# Patient Record
Sex: FEMALE | Race: WHITE | Employment: OTHER | URBAN - METROPOLITAN AREA
[De-identification: names, ages, dates, MRNs, and addresses within clinical notes are randomized per-mention and may not be internally consistent; named-entity substitution may affect disease eponyms.]

---

## 2017-09-22 ENCOUNTER — APPOINTMENT (OUTPATIENT)
Dept: LAB | Age: 82
End: 2017-09-22
Attending: INTERNAL MEDICINE
Payer: MEDICARE

## 2017-09-22 PROCEDURE — 84166 PROTEIN E-PHORESIS/URINE/CSF: CPT | Performed by: INTERNAL MEDICINE

## 2017-09-22 PROCEDURE — 86335 IMMUNFIX E-PHORSIS/URINE/CSF: CPT | Performed by: INTERNAL MEDICINE

## 2019-01-03 ENCOUNTER — HOSPITAL ENCOUNTER (OUTPATIENT)
Dept: GENERAL RADIOLOGY | Age: 84
Discharge: HOME | End: 2019-01-03
Attending: INTERNAL MEDICINE
Payer: MEDICARE

## 2019-01-03 DIAGNOSIS — R05.9 COUGH: ICD-10-CM

## 2019-01-03 PROCEDURE — 71046 X-RAY EXAM CHEST 2 VIEWS: CPT | Performed by: INTERNAL MEDICINE

## 2019-08-06 PROBLEM — S52.501A CLOSED FRACTURE OF RIGHT DISTAL RADIUS: Status: ACTIVE | Noted: 2019-08-06

## 2020-02-25 PROBLEM — L30.9 DERMATITIS: Status: ACTIVE | Noted: 2020-02-25

## 2020-02-25 PROBLEM — G56.01 CARPAL TUNNEL SYNDROME OF RIGHT WRIST: Status: ACTIVE | Noted: 2020-02-25

## 2020-09-03 ENCOUNTER — TELEPHONE (OUTPATIENT)
Dept: INTERNAL MEDICINE CLINIC | Facility: CLINIC | Age: 85
End: 2020-09-03

## 2020-09-03 ENCOUNTER — APPOINTMENT (OUTPATIENT)
Dept: GENERAL RADIOLOGY | Facility: HOSPITAL | Age: 85
End: 2020-09-03
Attending: EMERGENCY MEDICINE
Payer: MEDICARE

## 2020-09-03 ENCOUNTER — HOSPITAL ENCOUNTER (OUTPATIENT)
Facility: HOSPITAL | Age: 85
Setting detail: OBSERVATION
Discharge: HOME | End: 2020-09-04
Attending: EMERGENCY MEDICINE | Admitting: HOSPITALIST
Payer: MEDICARE

## 2020-09-03 DIAGNOSIS — R55 SYNCOPE AND COLLAPSE: Primary | ICD-10-CM

## 2020-09-03 LAB
ANION GAP SERPL CALC-SCNC: 6 MMOL/L (ref 0–18)
BASOPHILS # BLD AUTO: 0.04 X10(3) UL (ref 0–0.2)
BASOPHILS NFR BLD AUTO: 0.4 %
BUN BLD-MCNC: 17 MG/DL (ref 7–18)
BUN/CREAT SERPL: 17.3 (ref 10–20)
CALCIUM BLD-MCNC: 9.6 MG/DL (ref 8.5–10.1)
CHLORIDE SERPL-SCNC: 100 MMOL/L (ref 98–112)
CO2 SERPL-SCNC: 25 MMOL/L (ref 21–32)
CREAT BLD-MCNC: 0.98 MG/DL (ref 0.55–1.02)
DEPRECATED RDW RBC AUTO: 47.2 FL (ref 35.1–46.3)
EOSINOPHIL # BLD AUTO: 0.08 X10(3) UL (ref 0–0.7)
EOSINOPHIL NFR BLD AUTO: 0.8 %
ERYTHROCYTE [DISTWIDTH] IN BLOOD BY AUTOMATED COUNT: 13.9 % (ref 11–15)
GLUCOSE BLD-MCNC: 91 MG/DL (ref 70–99)
HAV IGM SER QL: 2.2 MG/DL (ref 1.6–2.6)
HCT VFR BLD AUTO: 38.1 % (ref 35–48)
HGB BLD-MCNC: 12.9 G/DL (ref 12–16)
IMM GRANULOCYTES # BLD AUTO: 0.03 X10(3) UL (ref 0–1)
IMM GRANULOCYTES NFR BLD: 0.3 %
LYMPHOCYTES # BLD AUTO: 1.59 X10(3) UL (ref 1–4)
LYMPHOCYTES NFR BLD AUTO: 15.5 %
MCH RBC QN AUTO: 31.4 PG (ref 26–34)
MCHC RBC AUTO-ENTMCNC: 33.9 G/DL (ref 31–37)
MCV RBC AUTO: 92.7 FL (ref 80–100)
MONOCYTES # BLD AUTO: 0.52 X10(3) UL (ref 0.1–1)
MONOCYTES NFR BLD AUTO: 5.1 %
NEUTROPHILS # BLD AUTO: 8.03 X10 (3) UL (ref 1.5–7.7)
NEUTROPHILS # BLD AUTO: 8.03 X10(3) UL (ref 1.5–7.7)
NEUTROPHILS NFR BLD AUTO: 77.9 %
OSMOLALITY SERPL CALC.SUM OF ELEC: 273 MOSM/KG (ref 275–295)
PLATELET # BLD AUTO: 239 10(3)UL (ref 150–450)
POTASSIUM SERPL-SCNC: 4.2 MMOL/L (ref 3.5–5.1)
RBC # BLD AUTO: 4.11 X10(6)UL (ref 3.8–5.3)
SODIUM SERPL-SCNC: 131 MMOL/L (ref 136–145)
TROPONIN I SERPL-MCNC: <0.045 NG/ML (ref ?–0.04)
TROPONIN I SERPL-MCNC: <0.045 NG/ML (ref ?–0.04)
WBC # BLD AUTO: 10.3 X10(3) UL (ref 4–11)

## 2020-09-03 PROCEDURE — 71045 X-RAY EXAM CHEST 1 VIEW: CPT | Performed by: EMERGENCY MEDICINE

## 2020-09-03 PROCEDURE — 99220 INITIAL OBSERVATION CARE,LEVL III: CPT | Performed by: HOSPITALIST

## 2020-09-03 RX ORDER — ACETAMINOPHEN 325 MG/1
650 TABLET ORAL EVERY 6 HOURS PRN
Status: DISCONTINUED | OUTPATIENT
Start: 2020-09-03 | End: 2020-09-04

## 2020-09-03 RX ORDER — ONDANSETRON 2 MG/ML
4 INJECTION INTRAMUSCULAR; INTRAVENOUS EVERY 6 HOURS PRN
Status: DISCONTINUED | OUTPATIENT
Start: 2020-09-03 | End: 2020-09-04

## 2020-09-03 RX ORDER — SODIUM CHLORIDE 0.9 % (FLUSH) 0.9 %
3 SYRINGE (ML) INJECTION AS NEEDED
Status: DISCONTINUED | OUTPATIENT
Start: 2020-09-03 | End: 2020-09-04

## 2020-09-03 RX ORDER — METOPROLOL SUCCINATE 100 MG/1
100 TABLET, EXTENDED RELEASE ORAL
Status: DISCONTINUED | OUTPATIENT
Start: 2020-09-04 | End: 2020-09-04

## 2020-09-03 RX ORDER — HEPARIN SODIUM 5000 [USP'U]/ML
5000 INJECTION, SOLUTION INTRAVENOUS; SUBCUTANEOUS EVERY 12 HOURS SCHEDULED
Status: DISCONTINUED | OUTPATIENT
Start: 2020-09-03 | End: 2020-09-04

## 2020-09-03 RX ORDER — METOCLOPRAMIDE HYDROCHLORIDE 5 MG/ML
5 INJECTION INTRAMUSCULAR; INTRAVENOUS EVERY 8 HOURS PRN
Status: DISCONTINUED | OUTPATIENT
Start: 2020-09-03 | End: 2020-09-04

## 2020-09-03 RX ORDER — LOSARTAN POTASSIUM 50 MG/1
50 TABLET ORAL DAILY
Status: DISCONTINUED | OUTPATIENT
Start: 2020-09-03 | End: 2020-09-04

## 2020-09-03 NOTE — H&P
[facility]    PATIENT'S NAME: Victor Manuel Peter   ATTENDING PHYSICIAN: Alicia Pathak MD   PATIENT ACCOUNT#:   [id number]    LOCATION:  [place] [address]  MEDICAL RECORD #:   [id number]       YOB: 1929  ADMISSION DATE:       09/03/ threw up. No diaphoresis. No chest pain. Other 12-point review of systems negative. Patient said lately her blood pressure readings were unusually low in the 968T systolic, which is usually low for her.   She ran out of losartan and did not take it toda

## 2020-09-03 NOTE — ED NOTES
Orders for admission, patient is aware of plan and ready to go upstairs. Any questions, please call ED RN TRUNG  at extension [phone].   Type of COVID test sent: SARS COV2 PCR  COVID Suspicion level: Low    Titratable drug(s) infusing: NONE    LOC at time of

## 2020-09-03 NOTE — ED PROVIDER NOTES
Patient Seen in: [facility] AND [facility] Emergency Department      History   Patient presents with:  Syncope    Stated Complaint: Syncopal episode     HPI    40-year-old female presents for syncopal episode.   Patient states that she began feeling lightheaded a reviewed and negative except as noted above.     Physical Exam     ED Triage Vitals [09/03/20 1351]   /70   Pulse 63   Resp 18   Temp 98 °F (36.7 °C)   Temp src    SpO2 98 %   O2 Device        Current:BP (!) 182/75   Pulse 66   Temp 98 °F (36.7 °C) Non- 51 (*)     GFR, -American 58 (*)     All other components within normal limits   CBC W/ DIFFERENTIAL - Abnormal; Notable for the following components:    RDW-SD 47.2 (*)     Neutrophil Absolute Prelim 8.03 (*)     Neutrophil Abs disease.     Dictated by (CST): Radha Rodriguez MD on 9/03/2020 at 2:57 PM     Finalized by (CST): Radha Rodriguez MD on 9/03/2020 at 2:58 PM              Rhythm Strip: Rate 88 sinus    Total Critical Care Time: 35 minutes including time spent examining

## 2020-09-03 NOTE — TELEPHONE ENCOUNTER
JOSÉ MIGUEL- Pt's daughter explained Pt is a New Pt -scheduled for 8/16/20, wanted to give Dr an Update     Pt's daughter called stated Pt told her she felt weak and passed out before her but did not fall she held her, 911 called, Pt vomited after waking up ,ddecl

## 2020-09-03 NOTE — ED INITIAL ASSESSMENT (HPI)
Patient complains of syncopizing today, states her daughter helped her to the ground, denies symptoms at this time

## 2020-09-03 NOTE — ED NOTES
Pt alert and oriented with clear speech, states she had a syncopal episode earlier today where her daughter was able to lower her to the floor. Pt did not hit head but daughter states that pt loss consciousness for a minute.  Pt denies pain, dizziness, ligh

## 2020-09-04 ENCOUNTER — APPOINTMENT (OUTPATIENT)
Dept: CV DIAGNOSTICS | Facility: HOSPITAL | Age: 85
End: 2020-09-04
Attending: HOSPITALIST
Payer: MEDICARE

## 2020-09-04 ENCOUNTER — APPOINTMENT (OUTPATIENT)
Dept: ULTRASOUND IMAGING | Facility: HOSPITAL | Age: 85
End: 2020-09-04
Attending: HOSPITALIST
Payer: MEDICARE

## 2020-09-04 VITALS
OXYGEN SATURATION: 95 % | DIASTOLIC BLOOD PRESSURE: 64 MMHG | SYSTOLIC BLOOD PRESSURE: 125 MMHG | HEIGHT: 63 IN | HEART RATE: 66 BPM | WEIGHT: 120 LBS | RESPIRATION RATE: 16 BRPM | BODY MASS INDEX: 21.26 KG/M2 | TEMPERATURE: 99 F

## 2020-09-04 LAB
ANION GAP SERPL CALC-SCNC: 5 MMOL/L (ref 0–18)
BASOPHILS # BLD AUTO: 0.03 X10(3) UL (ref 0–0.2)
BASOPHILS NFR BLD AUTO: 0.4 %
BUN BLD-MCNC: 21 MG/DL (ref 7–18)
BUN/CREAT SERPL: 22.3 (ref 10–20)
CALCIUM BLD-MCNC: 9.2 MG/DL (ref 8.5–10.1)
CHLORIDE SERPL-SCNC: 100 MMOL/L (ref 98–112)
CO2 SERPL-SCNC: 29 MMOL/L (ref 21–32)
CREAT BLD-MCNC: 0.94 MG/DL (ref 0.55–1.02)
DEPRECATED RDW RBC AUTO: 46.7 FL (ref 35.1–46.3)
EOSINOPHIL # BLD AUTO: 0.14 X10(3) UL (ref 0–0.7)
EOSINOPHIL NFR BLD AUTO: 1.9 %
ERYTHROCYTE [DISTWIDTH] IN BLOOD BY AUTOMATED COUNT: 13.8 % (ref 11–15)
GLUCOSE BLD-MCNC: 93 MG/DL (ref 70–99)
HCT VFR BLD AUTO: 33.8 % (ref 35–48)
HGB BLD-MCNC: 11.4 G/DL (ref 12–16)
IMM GRANULOCYTES # BLD AUTO: 0.02 X10(3) UL (ref 0–1)
IMM GRANULOCYTES NFR BLD: 0.3 %
LYMPHOCYTES # BLD AUTO: 2.43 X10(3) UL (ref 1–4)
LYMPHOCYTES NFR BLD AUTO: 33.7 %
MCH RBC QN AUTO: 31.2 PG (ref 26–34)
MCHC RBC AUTO-ENTMCNC: 33.7 G/DL (ref 31–37)
MCV RBC AUTO: 92.6 FL (ref 80–100)
MONOCYTES # BLD AUTO: 0.55 X10(3) UL (ref 0.1–1)
MONOCYTES NFR BLD AUTO: 7.6 %
NEUTROPHILS # BLD AUTO: 4.05 X10 (3) UL (ref 1.5–7.7)
NEUTROPHILS # BLD AUTO: 4.05 X10(3) UL (ref 1.5–7.7)
NEUTROPHILS NFR BLD AUTO: 56.1 %
OSMOLALITY SERPL CALC.SUM OF ELEC: 281 MOSM/KG (ref 275–295)
PLATELET # BLD AUTO: 223 10(3)UL (ref 150–450)
POTASSIUM SERPL-SCNC: 4.2 MMOL/L (ref 3.5–5.1)
RBC # BLD AUTO: 3.65 X10(6)UL (ref 3.8–5.3)
SARS-COV-2 RNA RESP QL NAA+PROBE: NOT DETECTED
SODIUM SERPL-SCNC: 134 MMOL/L (ref 136–145)
TSI SER-ACNC: 1.64 MIU/ML (ref 0.36–3.74)
WBC # BLD AUTO: 7.2 X10(3) UL (ref 4–11)

## 2020-09-04 PROCEDURE — 93880 EXTRACRANIAL BILAT STUDY: CPT | Performed by: HOSPITALIST

## 2020-09-04 PROCEDURE — 93306 TTE W/DOPPLER COMPLETE: CPT | Performed by: HOSPITALIST

## 2020-09-04 PROCEDURE — 99217 OBSERVATION CARE DISCHARGE: CPT | Performed by: HOSPITALIST

## 2020-09-04 RX ORDER — ESCITALOPRAM OXALATE 10 MG/1
10 TABLET ORAL DAILY
Status: DISCONTINUED | OUTPATIENT
Start: 2020-09-04 | End: 2020-09-04

## 2020-09-04 RX ORDER — ROSUVASTATIN CALCIUM 20 MG/1
40 TABLET, COATED ORAL NIGHTLY
Status: DISCONTINUED | OUTPATIENT
Start: 2020-09-04 | End: 2020-09-04

## 2020-09-04 RX ORDER — METOPROLOL SUCCINATE 100 MG/1
100 TABLET, EXTENDED RELEASE ORAL
Status: DISCONTINUED | OUTPATIENT
Start: 2020-09-05 | End: 2020-09-04

## 2020-09-04 RX ORDER — LOSARTAN POTASSIUM 100 MG/1
100 TABLET ORAL DAILY
Status: DISCONTINUED | OUTPATIENT
Start: 2020-09-04 | End: 2020-09-04

## 2020-09-04 RX ORDER — ASPIRIN 81 MG/1
81 TABLET ORAL DAILY
Status: DISCONTINUED | OUTPATIENT
Start: 2020-09-04 | End: 2020-09-04

## 2020-09-04 RX ORDER — AMLODIPINE BESYLATE 10 MG/1
10 TABLET ORAL DAILY
Status: DISCONTINUED | OUTPATIENT
Start: 2020-09-04 | End: 2020-09-04

## 2020-09-04 RX ORDER — HYDRALAZINE HYDROCHLORIDE 25 MG/1
25 TABLET, FILM COATED ORAL EVERY 6 HOURS SCHEDULED
Status: DISCONTINUED | OUTPATIENT
Start: 2020-09-04 | End: 2020-09-04

## 2020-09-04 RX ORDER — CETIRIZINE HYDROCHLORIDE 5 MG/1
5 TABLET ORAL DAILY
Status: DISCONTINUED | OUTPATIENT
Start: 2020-09-04 | End: 2020-09-04

## 2020-09-04 RX ORDER — ATENOLOL 50 MG/1
100 TABLET ORAL DAILY
Status: DISCONTINUED | OUTPATIENT
Start: 2020-09-04 | End: 2020-09-04

## 2020-09-04 NOTE — PLAN OF CARE
Problem: Patient Centered Care  Goal: Patient preferences are identified and integrated in the patient's plan of care  Description  Interventions:  - What would you like us to know as we care for you?  Home with family  - Provide timely, complete, and acc and heart rate control medications as ordered  - Initiate emergency measures for life threatening arrhythmias  - Monitor electrolytes and administer replacement therapy as ordered  Outcome: Completed    Patient was provided with discharge instructions, edu

## 2020-09-04 NOTE — CM/SW NOTE
SW self referred pt for d/c planning. SW met w/ pt in her room. Pt verified her address and confirmed living w/ her dtr, Viry Barahona. Pt reports having a home w/ 3 levels. Pt reports using the main level and second level (bedroom).  Pt reports having 4 steps u

## 2020-09-04 NOTE — PLAN OF CARE
Problem: Patient Centered Care  Goal: Patient preferences are identified and integrated in the patient's plan of care  Description  Interventions:  - What would you like us to know as we care for you?   - Provide timely, complete, and accurate informatio heart rate control medications as ordered  - Initiate emergency measures for life threatening arrhythmias  - Monitor electrolytes and administer replacement therapy as ordered  Outcome: Progressing    Patient has no complaints overnight.  Denies dizziness,

## 2020-09-04 NOTE — PLAN OF CARE
VS stable. Patient states she is feeling so much better today. Denies any pain, dizziness, or shortness of breath. NSR on tele. On room air. Daughter updated over the phone. Fall prec in place. Will continue to monitor.     Problem: Patient Centered Care  G weights  Outcome: Progressing  Goal: Absence of cardiac arrhythmias or at baseline  Description  INTERVENTIONS:  - Continuous cardiac monitoring, monitor vital signs, obtain 12 lead EKG if indicated  - Evaluate effectiveness of antiarrhythmic and heart rat

## 2020-09-04 NOTE — DISCHARGE SUMMARY
[facility]    Discharge Summary    Marina Hooker Patient Status:  Observation    1929 MRN [id number]   Location [facility] 3W/SW Attending Yuan Roblero MD   Hosp Day # 0 PCP Kwabena Holm MD     Date of Admission: 9/3/2 HTN  Continue metoprolol and losartan, stop amlodipine   Stop hydralazine  Stop atenolol     Complications: none             Discharge Plan:   Discharge Condition: Stable    Current Discharge Medication List    Home Meds - Unchanged    Rosuvastatin Don week.    Specialty:  Internal Medicine  Contact information:  [address] [place] [address]  [phone]                   Follow up Labs and imaging:          Other Discharge Instructions:       Low cholesterol Low sodium        Time spent:  > 30 mi

## 2020-09-05 RX ORDER — LOSARTAN POTASSIUM 100 MG/1
100 TABLET ORAL DAILY
Qty: 30 TABLET | Refills: 0 | Status: SHIPPED | OUTPATIENT
Start: 2020-09-05 | End: 2020-10-06

## 2020-09-05 NOTE — TELEPHONE ENCOUNTER
Daughter, Junior Springer calling for a prescription for Losartan sent into [facility] on [facility].     Dr.Kandel- SEBASTIAN have pended it for your approval.  Thanks

## 2020-09-08 ENCOUNTER — PATIENT OUTREACH (OUTPATIENT)
Dept: CASE MANAGEMENT | Age: 85
End: 2020-09-08

## 2020-09-08 DIAGNOSIS — Z02.9 ENCOUNTERS FOR ADMINISTRATIVE PURPOSE: ICD-10-CM

## 2020-09-08 PROCEDURE — 1111F DSCHRG MED/CURRENT MED MERGE: CPT

## 2020-09-08 NOTE — PROGRESS NOTES
Initial Post Discharge Follow Up   Discharge Date: 9/4/20  Contact Date: 9/8/2020    Consent Verification:  Assessment Completed With: Patient  HIPAA Verified?   Yes    Discharge Dx:   Syncope and collapse         General:   • How have you been since you to make sure we are not missing anything? yes  • Are there any reasons that keep you from taking your medication as prescribed? No  Are you having any concerns with constipation? No    Referrals/orders at D/C:  Home Health/Services ordered at D/C?   No    DM reviewed/discussed/and reconciled against outpatient medications with patient,  and orders reviewed and discussed. Any changes or updates to medications and or orders sent to PCP.

## 2020-09-16 ENCOUNTER — OFFICE VISIT (OUTPATIENT)
Dept: INTERNAL MEDICINE CLINIC | Facility: CLINIC | Age: 85
End: 2020-09-16
Payer: MEDICARE

## 2020-09-16 VITALS
SYSTOLIC BLOOD PRESSURE: 182 MMHG | WEIGHT: 120 LBS | RESPIRATION RATE: 16 BRPM | DIASTOLIC BLOOD PRESSURE: 66 MMHG | HEIGHT: 63 IN | BODY MASS INDEX: 21.26 KG/M2 | HEART RATE: 57 BPM

## 2020-09-16 DIAGNOSIS — I10 ESSENTIAL HYPERTENSION: Primary | ICD-10-CM

## 2020-09-16 DIAGNOSIS — D89.0 POLYCLONAL GAMMOPATHY: ICD-10-CM

## 2020-09-16 DIAGNOSIS — E78.00 PURE HYPERCHOLESTEROLEMIA: ICD-10-CM

## 2020-09-16 DIAGNOSIS — R63.4 WEIGHT LOSS, NON-INTENTIONAL: ICD-10-CM

## 2020-09-16 PROCEDURE — 1111F DSCHRG MED/CURRENT MED MERGE: CPT | Performed by: INTERNAL MEDICINE

## 2020-09-16 PROCEDURE — 99495 TRANSJ CARE MGMT MOD F2F 14D: CPT | Performed by: INTERNAL MEDICINE

## 2020-09-16 RX ORDER — AMLODIPINE BESYLATE 5 MG/1
5 TABLET ORAL DAILY
Qty: 90 TABLET | Refills: 1 | Status: SHIPPED | OUTPATIENT
Start: 2020-09-16 | End: 2020-09-16

## 2020-09-16 RX ORDER — AMLODIPINE BESYLATE 5 MG/1
5 TABLET ORAL DAILY
Qty: 90 TABLET | Refills: 1 | Status: SHIPPED | OUTPATIENT
Start: 2020-09-16 | End: 2020-10-16

## 2020-09-16 NOTE — PROGRESS NOTES
HPI:    Poonam Chatterjee is a 80year old female here today for hospital follow up.    She was discharged from Inpatient hospital[facility]  to Home   Admission Date: 9/3/20   Discharge Date: 9/4/20  Hospital Discharge Diagnoses (since 8/17/2020) weight, he had no appetite, now she states that she regained weight. Patient seen in the office with her daughter. She gives history of 2 TIAs in the past.  He had a history of skin cancer removed from the left side of the face.   Allergies:  She has No K Negative for inappropriate interaction and psychiatric symptoms  PHYSICAL EXAM:   No LMP recorded. Estimated body mass index is 21.26 kg/m² as calculated from the following:    Height as of this encounter: 5' 3\" (1.6 m).     Weight as of this encounter: 1 non-intentional check TSH level rule out hyperthyroidism, patient will monitor weight at home weekly and follow-up in 1 month  -     TSH W REFLEX TO FREE T4; Future    Polyclonal gammopathy by history, check serum protein electrophoresis  -     SERUM PROTE

## 2020-10-06 RX ORDER — LOSARTAN POTASSIUM 100 MG/1
TABLET ORAL
Qty: 30 TABLET | Refills: 0 | Status: SHIPPED | OUTPATIENT
Start: 2020-10-06 | End: 2020-10-16

## 2020-10-13 ENCOUNTER — LAB ENCOUNTER (OUTPATIENT)
Dept: LAB | Age: 85
End: 2020-10-13
Attending: INTERNAL MEDICINE
Payer: MEDICARE

## 2020-10-13 DIAGNOSIS — R63.4 WEIGHT LOSS, NON-INTENTIONAL: ICD-10-CM

## 2020-10-13 DIAGNOSIS — E78.00 PURE HYPERCHOLESTEROLEMIA: ICD-10-CM

## 2020-10-13 DIAGNOSIS — D89.0 POLYCLONAL GAMMOPATHY: ICD-10-CM

## 2020-10-13 DIAGNOSIS — I10 ESSENTIAL HYPERTENSION: ICD-10-CM

## 2020-10-13 PROCEDURE — 36415 COLL VENOUS BLD VENIPUNCTURE: CPT

## 2020-10-13 PROCEDURE — 80061 LIPID PANEL: CPT

## 2020-10-13 PROCEDURE — 82784 ASSAY IGA/IGD/IGG/IGM EACH: CPT

## 2020-10-13 PROCEDURE — 84443 ASSAY THYROID STIM HORMONE: CPT

## 2020-10-13 PROCEDURE — 84165 PROTEIN E-PHORESIS SERUM: CPT

## 2020-10-13 PROCEDURE — 85025 COMPLETE CBC W/AUTO DIFF WBC: CPT

## 2020-10-13 PROCEDURE — 80053 COMPREHEN METABOLIC PANEL: CPT

## 2020-10-13 PROCEDURE — 84439 ASSAY OF FREE THYROXINE: CPT

## 2020-10-13 PROCEDURE — 86334 IMMUNOFIX E-PHORESIS SERUM: CPT

## 2020-10-16 ENCOUNTER — OFFICE VISIT (OUTPATIENT)
Dept: INTERNAL MEDICINE CLINIC | Facility: CLINIC | Age: 85
End: 2020-10-16
Payer: MEDICARE

## 2020-10-16 ENCOUNTER — TELEPHONE (OUTPATIENT)
Dept: INTERNAL MEDICINE CLINIC | Facility: CLINIC | Age: 85
End: 2020-10-16

## 2020-10-16 VITALS
DIASTOLIC BLOOD PRESSURE: 66 MMHG | SYSTOLIC BLOOD PRESSURE: 147 MMHG | HEART RATE: 61 BPM | WEIGHT: 121 LBS | RESPIRATION RATE: 18 BRPM | BODY MASS INDEX: 21 KG/M2

## 2020-10-16 DIAGNOSIS — I10 ESSENTIAL HYPERTENSION: Primary | ICD-10-CM

## 2020-10-16 DIAGNOSIS — D47.2 MGUS (MONOCLONAL GAMMOPATHY OF UNKNOWN SIGNIFICANCE): ICD-10-CM

## 2020-10-16 DIAGNOSIS — M65.30 TRIGGER FINGER OF RIGHT HAND, UNSPECIFIED FINGER: ICD-10-CM

## 2020-10-16 PROCEDURE — 99213 OFFICE O/P EST LOW 20 MIN: CPT | Performed by: INTERNAL MEDICINE

## 2020-10-16 PROCEDURE — G0463 HOSPITAL OUTPT CLINIC VISIT: HCPCS | Performed by: INTERNAL MEDICINE

## 2020-10-16 PROCEDURE — 90662 IIV NO PRSV INCREASED AG IM: CPT | Performed by: INTERNAL MEDICINE

## 2020-10-16 PROCEDURE — G0008 ADMIN INFLUENZA VIRUS VAC: HCPCS | Performed by: INTERNAL MEDICINE

## 2020-10-16 RX ORDER — LOSARTAN POTASSIUM 100 MG/1
100 TABLET ORAL DAILY
Qty: 90 TABLET | Refills: 3 | Status: SHIPPED | OUTPATIENT
Start: 2020-10-16 | End: 2021-05-06

## 2020-10-16 RX ORDER — METOPROLOL SUCCINATE 100 MG/1
100 TABLET, EXTENDED RELEASE ORAL DAILY
Qty: 90 TABLET | Refills: 3 | Status: SHIPPED | OUTPATIENT
Start: 2020-10-16 | End: 2021-05-06

## 2020-10-16 RX ORDER — ROSUVASTATIN CALCIUM 40 MG/1
40 TABLET, COATED ORAL NIGHTLY
Qty: 90 TABLET | Refills: 3 | Status: SHIPPED | OUTPATIENT
Start: 2020-10-16 | End: 2021-05-06

## 2020-10-16 RX ORDER — AMLODIPINE BESYLATE 5 MG/1
5 TABLET ORAL DAILY
Qty: 90 TABLET | Refills: 3 | Status: SHIPPED | OUTPATIENT
Start: 2020-10-16 | End: 2021-03-14

## 2020-10-19 NOTE — PROGRESS NOTES
HPI:    Patient ID: Marina Hooker is a 80year old female.   Presents for follow-up on hypertension, abnormal labs    HPI  Patient states that she has been feeling well she is here accompanied by her daughter, she brought medication bottles that she take is clear and moist.   Eyes: Pupils are equal, round, and reactive to light. Conjunctivae and EOM are normal. No scleral icterus. Neck: Normal range of motion. Neck supple. No JVD present.    Cardiovascular: Normal rate, regular rhythm and normal heart gabriella

## 2020-10-20 ENCOUNTER — OFFICE VISIT (OUTPATIENT)
Dept: HEMATOLOGY/ONCOLOGY | Facility: HOSPITAL | Age: 85
End: 2020-10-20
Attending: INTERNAL MEDICINE
Payer: MEDICARE

## 2020-10-20 VITALS
OXYGEN SATURATION: 96 % | HEART RATE: 57 BPM | SYSTOLIC BLOOD PRESSURE: 171 MMHG | DIASTOLIC BLOOD PRESSURE: 61 MMHG | TEMPERATURE: 97 F | HEIGHT: 63 IN | WEIGHT: 121 LBS | BODY MASS INDEX: 21.44 KG/M2

## 2020-10-20 DIAGNOSIS — D47.2 MGUS (MONOCLONAL GAMMOPATHY OF UNKNOWN SIGNIFICANCE): Primary | ICD-10-CM

## 2020-10-20 PROCEDURE — 99204 OFFICE O/P NEW MOD 45 MIN: CPT | Performed by: INTERNAL MEDICINE

## 2020-10-20 NOTE — CONSULTS
[facility] History and Physical    Patient Name: Zulay Mclaughlin   YOB: 1929   Medical Record Number: [id number]   CSN: [id number]   Attending Physician:  Hitesh Allen MD       Date of Visit: 10/20/2020     Chief Complaint:  MGUS     Histo Age of Onset   • Cancer Father         throat cancer   • Other (Other) Mother         Parkinson's   • Heart Disease Brother        Social History:  She is of [place] descent. 2 boys and 1 girl she lives with her girl right now.   She travels to [place] reviewed:    Lab Results   Component Value Date    WBC 6.7 10/13/2020    RBC 4.09 10/13/2020    HGB 12.5 10/13/2020    HCT 38.6 10/13/2020    MCV 94.4 10/13/2020    MCH 30.6 10/13/2020    MCHC 32.4 10/13/2020    RDW 13.4 10/13/2020    .0 10/13/2020

## 2021-01-20 ENCOUNTER — TELEPHONE (OUTPATIENT)
Dept: INTERNAL MEDICINE CLINIC | Facility: CLINIC | Age: 86
End: 2021-01-20

## 2021-03-12 DIAGNOSIS — Z23 NEED FOR VACCINATION: ICD-10-CM

## 2021-03-14 RX ORDER — AMLODIPINE BESYLATE 5 MG/1
TABLET ORAL
Qty: 90 TABLET | Refills: 3 | Status: SHIPPED | OUTPATIENT
Start: 2021-03-14 | End: 2021-05-05

## 2021-03-23 ENCOUNTER — OFFICE VISIT (OUTPATIENT)
Dept: INTERNAL MEDICINE CLINIC | Facility: CLINIC | Age: 86
End: 2021-03-23
Payer: MEDICARE

## 2021-03-23 VITALS
HEART RATE: 71 BPM | WEIGHT: 121.19 LBS | RESPIRATION RATE: 18 BRPM | BODY MASS INDEX: 21.47 KG/M2 | TEMPERATURE: 99 F | DIASTOLIC BLOOD PRESSURE: 80 MMHG | HEIGHT: 63 IN | SYSTOLIC BLOOD PRESSURE: 166 MMHG

## 2021-03-23 DIAGNOSIS — S39.012A STRAIN OF LUMBAR REGION, INITIAL ENCOUNTER: ICD-10-CM

## 2021-03-23 DIAGNOSIS — I10 ESSENTIAL HYPERTENSION: Primary | ICD-10-CM

## 2021-03-23 DIAGNOSIS — E78.00 PURE HYPERCHOLESTEROLEMIA: ICD-10-CM

## 2021-03-23 DIAGNOSIS — M54.16 LUMBAR RADICULOPATHY: ICD-10-CM

## 2021-03-23 DIAGNOSIS — D47.2 MGUS (MONOCLONAL GAMMOPATHY OF UNKNOWN SIGNIFICANCE): ICD-10-CM

## 2021-03-23 PROCEDURE — G0439 PPPS, SUBSEQ VISIT: HCPCS | Performed by: INTERNAL MEDICINE

## 2021-03-24 NOTE — PROGRESS NOTES
HPI:   Lindsay Black is a 80year old female who presents for a Medicare Subsequent Annual Wellness visit (Pt already had Initial Annual Wellness).       Patient reports that overall she has been feeling well, she has intermittent chronic low back pain, living will, provided educational material     She does NOT have a Power of  for [employer] on file in [address] [place] [address] Rd.    Discussed with patient need to create power of  for healthcare, provided patient with educational material, requested copy of do tablet (100 mg total) by mouth daily. Escitalopram Oxalate (LEXAPRO) 10 MG Oral Tab, Take 1 tablet by mouth daily. aspirin 81 MG Oral Tab, Take 81 mg by mouth daily.        MEDICAL INFORMATION:   She  has a past medical history of Arm numbness left (7/21/ two or more people are talking at the same time: Sometimes   I have trouble understanding things on the TV: No I have to strain to understand conversations: Sometimes   I have to worry about missing the telephone ring or doorbell: Sometimes I have trouble Psychiatric:         Behavior: Behavior normal.         Thought Content:  Thought content normal.         Judgment: Judgment normal.         Vaccination History     Immunization History   Administered Date(s) Administered   • FLU VAC High Dose 65 YRS & [employer] Internal Lab or Procedure External Lab or Procedure   Diabetes Screening      HbgA1C   Annually No results found for: A1C No flowsheet data found.     Fasting Blood Sugar (FSB)Annually Glucose (mg/dL)   Date Value   10/13/2020 91          Cardiovascular Dis Moderate/High Risk No vaccine history found Medium/high risk factors:   End-stage renal disease   Hemophiliacs who received Factor VIII or IX concentrates   Clients of institutions for the mentally retarded   Persons who live in the same house as a HepB vi

## 2021-03-28 PROBLEM — N18.30 BENIGN HYPERTENSION WITH CKD (CHRONIC KIDNEY DISEASE) STAGE III (HCC): Status: ACTIVE | Noted: 2021-03-28

## 2021-03-28 PROBLEM — R55 SYNCOPE AND COLLAPSE: Status: RESOLVED | Noted: 2020-09-03 | Resolved: 2021-03-28

## 2021-03-28 PROBLEM — I12.9 BENIGN HYPERTENSION WITH CKD (CHRONIC KIDNEY DISEASE) STAGE III (HCC): Status: ACTIVE | Noted: 2021-03-28

## 2021-03-28 PROBLEM — I10 ESSENTIAL HYPERTENSION: Status: ACTIVE | Noted: 2021-03-28

## 2021-03-28 PROBLEM — S52.501A CLOSED FRACTURE OF RIGHT DISTAL RADIUS: Status: RESOLVED | Noted: 2019-08-06 | Resolved: 2021-03-28

## 2021-03-28 PROBLEM — E78.00 PURE HYPERCHOLESTEROLEMIA: Status: ACTIVE | Noted: 2021-03-28

## 2021-03-28 PROBLEM — L30.9 DERMATITIS: Status: RESOLVED | Noted: 2020-02-25 | Resolved: 2021-03-28

## 2021-04-22 ENCOUNTER — APPOINTMENT (OUTPATIENT)
Dept: MRI IMAGING | Facility: HOSPITAL | Age: 86
End: 2021-04-22
Attending: EMERGENCY MEDICINE
Payer: MEDICARE

## 2021-04-22 ENCOUNTER — TELEPHONE (OUTPATIENT)
Dept: INTERNAL MEDICINE CLINIC | Facility: CLINIC | Age: 86
End: 2021-04-22

## 2021-04-22 ENCOUNTER — APPOINTMENT (OUTPATIENT)
Dept: CV DIAGNOSTICS | Facility: HOSPITAL | Age: 86
End: 2021-04-22
Attending: HOSPITALIST
Payer: MEDICARE

## 2021-04-22 ENCOUNTER — APPOINTMENT (OUTPATIENT)
Dept: CT IMAGING | Facility: HOSPITAL | Age: 86
End: 2021-04-22
Attending: EMERGENCY MEDICINE
Payer: MEDICARE

## 2021-04-22 ENCOUNTER — HOSPITAL ENCOUNTER (OUTPATIENT)
Facility: HOSPITAL | Age: 86
Setting detail: OBSERVATION
LOS: 1 days | Discharge: HOME | End: 2021-04-23
Attending: EMERGENCY MEDICINE | Admitting: HOSPITALIST
Payer: MEDICARE

## 2021-04-22 DIAGNOSIS — G45.9 TIA (TRANSIENT ISCHEMIC ATTACK): Primary | ICD-10-CM

## 2021-04-22 PROCEDURE — 70551 MRI BRAIN STEM W/O DYE: CPT | Performed by: EMERGENCY MEDICINE

## 2021-04-22 PROCEDURE — B246ZZZ ULTRASONOGRAPHY OF RIGHT AND LEFT HEART: ICD-10-PCS | Performed by: INTERNAL MEDICINE

## 2021-04-22 PROCEDURE — 99220 INITIAL OBSERVATION CARE,LEVL III: CPT | Performed by: HOSPITALIST

## 2021-04-22 PROCEDURE — 99203 OFFICE O/P NEW LOW 30 MIN: CPT | Performed by: OTHER

## 2021-04-22 PROCEDURE — 70450 CT HEAD/BRAIN W/O DYE: CPT | Performed by: EMERGENCY MEDICINE

## 2021-04-22 PROCEDURE — 93306 TTE W/DOPPLER COMPLETE: CPT | Performed by: HOSPITALIST

## 2021-04-22 RX ORDER — ASPIRIN 325 MG
325 TABLET ORAL DAILY
Status: DISCONTINUED | OUTPATIENT
Start: 2021-04-22 | End: 2021-04-23

## 2021-04-22 RX ORDER — ACETAMINOPHEN 325 MG/1
650 TABLET ORAL EVERY 4 HOURS PRN
Status: DISCONTINUED | OUTPATIENT
Start: 2021-04-22 | End: 2021-04-23

## 2021-04-22 RX ORDER — HYDRALAZINE HYDROCHLORIDE 20 MG/ML
10 INJECTION INTRAMUSCULAR; INTRAVENOUS EVERY 2 HOUR PRN
Status: DISCONTINUED | OUTPATIENT
Start: 2021-04-22 | End: 2021-04-23

## 2021-04-22 RX ORDER — ACETAMINOPHEN 650 MG/1
650 SUPPOSITORY RECTAL EVERY 4 HOURS PRN
Status: DISCONTINUED | OUTPATIENT
Start: 2021-04-22 | End: 2021-04-23

## 2021-04-22 RX ORDER — MULTIVITAMIN
1 TABLET ORAL DAILY
COMMUNITY

## 2021-04-22 RX ORDER — ASPIRIN 300 MG
300 SUPPOSITORY, RECTAL RECTAL DAILY
Status: DISCONTINUED | OUTPATIENT
Start: 2021-04-22 | End: 2021-04-23

## 2021-04-22 RX ORDER — BIOTIN 1 MG
1 TABLET ORAL DAILY
COMMUNITY

## 2021-04-22 RX ORDER — LABETALOL HYDROCHLORIDE 5 MG/ML
10 INJECTION, SOLUTION INTRAVENOUS EVERY 10 MIN PRN
Status: DISCONTINUED | OUTPATIENT
Start: 2021-04-22 | End: 2021-04-23

## 2021-04-22 RX ORDER — HEPARIN SODIUM 5000 [USP'U]/ML
5000 INJECTION, SOLUTION INTRAVENOUS; SUBCUTANEOUS EVERY 12 HOURS SCHEDULED
Status: DISCONTINUED | OUTPATIENT
Start: 2021-04-22 | End: 2021-04-23

## 2021-04-22 RX ORDER — ONDANSETRON 2 MG/ML
4 INJECTION INTRAMUSCULAR; INTRAVENOUS EVERY 6 HOURS PRN
Status: DISCONTINUED | OUTPATIENT
Start: 2021-04-22 | End: 2021-04-23

## 2021-04-22 RX ORDER — ASPIRIN 300 MG
300 SUPPOSITORY, RECTAL RECTAL ONCE
Status: COMPLETED | OUTPATIENT
Start: 2021-04-22 | End: 2021-04-22

## 2021-04-22 RX ORDER — DOCUSATE SODIUM 100 MG/1
100 CAPSULE, LIQUID FILLED ORAL 2 TIMES DAILY PRN
Status: DISCONTINUED | OUTPATIENT
Start: 2021-04-22 | End: 2021-04-23

## 2021-04-22 RX ORDER — METOCLOPRAMIDE HYDROCHLORIDE 5 MG/ML
5 INJECTION INTRAMUSCULAR; INTRAVENOUS EVERY 8 HOURS PRN
Status: DISCONTINUED | OUTPATIENT
Start: 2021-04-22 | End: 2021-04-23

## 2021-04-22 RX ORDER — ACETAMINOPHEN 325 MG/1
650 TABLET ORAL EVERY 6 HOURS PRN
Status: DISCONTINUED | OUTPATIENT
Start: 2021-04-22 | End: 2021-04-23

## 2021-04-22 NOTE — CM/SW NOTE
[facility]. SW met w/ pt and her dtr/Kim in pt's room. Pt verified her address and confirmed living w/ her dtr. They live in a home w/ 2 levels plus a basement. Pt reports using all levels in the home.  Pt informed SW there are appro

## 2021-04-22 NOTE — PROGRESS NOTES
Spoke first with pt's daughter Marge Dukes again while pt was out for further imaging.   Pt and daughter Marge Dukes (with whom pt lives) have had vaccination in past week, other members of the family are vocally against getting the vaccination, and are starting to blame

## 2021-04-22 NOTE — PROGRESS NOTES
Pt arrived having had some difficulty speaking. Pt had been transported to CT when pt's daughter Tod April arrived. Met with daughter in waiting room and escorted her to pt's room once approved by RN. Provided non-anxious presence. Will continue to monitor.

## 2021-04-22 NOTE — CONSULTS
[facility]    Cardiology Consultation    Lindsay Black Patient Status:  Emergency    1929 MRN [id number]   Location [address] Attending Priya Sanchez MD   [address] Day # 0 PCP Isaiah Jeans, MD     / tobacco. She reports that she does not drink alcohol and does not use drugs. Allergies:  No Known Allergies    Medications:  No current facility-administered medications for this encounter.     Review of Systems:  GENERAL HEALTH: feels well otherwise  SK 4/22/2021  CONCLUSION:  1. No evidence of acute intracranial hemorrhage or extended signs of acute large vessel infarction.  2. Nonspecific white matter changes involving both cerebral hemispheres that most likely reflect sequelae of chronic microangiopathy [id number] score of 4 risk of cardioembolic stroke 5 %/year. Recommend starting her on Eliquis dose adjust based on her weight and her age to 2.5 mg twice a day.   To minimize the risk of bleeding would recommend not starting her on Plavix and instead co

## 2021-04-22 NOTE — H&P
[facility]    PATIENT'S NAME: Momo Phelps   ATTENDING PHYSICIAN: Vivi Blevins MD   PATIENT ACCOUNT#:   [de-identified]    LOCATION:  [place] [address]  MEDICAL RECORD #:   [id number]       YOB: 1929  ADMISSION DATE:       04/22/ patient says waking up this morning she felt numbness and tingling on the right side of her mouth and then right upper extremity. She called her dog, but she could not get the words out. She denies any gait instability. No headache.   She continued to ha

## 2021-04-22 NOTE — CONSULTS
[facility]  [address] [facility]  [phone]          [address] [facility]    Report of Consultation    Hiram Yoder Patient Status:  Emergency MOUTH DAILY, Disp: 90 tablet, Rfl: 3  Metoprolol Succinate  MG Oral Tablet 24 Hr, Take 1 tablet (100 mg total) by mouth daily. , Disp: 90 tablet, Rfl: 3  Rosuvastatin Calcium 40 MG Oral Tab, Take 1 tablet (40 mg total) by mouth nightly., Disp: 90 tabl ALLERGIES  No Known Allergies    ? REVIEW OF SYSTEMS:   Neurological: See HPI. Constitutional:  No weight loss, fever, or chills. HEENT:  No recurrent or unexplained episodes of eye pain or ear pain. No dry eyes/dry mouth.  No throat or oral pain / le Coordination: Finger-to- nose-finger intact bilaterally and Heel-to-shin intact bilaterally. Gait: not assessed     LABS/DATA:  Hyponatremia 131  Unremarkable CBC  Results for Elizabeth Morillo (MRN [id number]) as of 4/22/2021 13:41   Ref.  Range 6/7/2016

## 2021-04-22 NOTE — ED INITIAL ASSESSMENT (HPI)
Pt via EMS from home for complaints of slurred speech upon waking this morning. Stroke alert called in field.

## 2021-04-22 NOTE — TELEPHONE ENCOUNTER
Spoke with pt's daughter per DEREJE CUNNINGHAM verified  She would like to inform Dr Brandon Good that pt is on the way to [facility] ER via ambulance. She think that pt having a stroke, pt had trouble speaking and facial problem.            JOSÉ MIGUEL

## 2021-04-22 NOTE — SLP NOTE
ADULT SWALLOWING EVALUATION    ASSESSMENT    ASSESSMENT/OVERALL IMPRESSION:  PPE REQUIRED. THIS THERAPIST WORE GLOVES, DROPLET MASK, AND GOGGLES FOR DURATION OF EVALUATION. HANDS WASHED UPON ENTRANCE/EXIT. SLP BSSE orders received and acknowledged.  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

## 2021-04-22 NOTE — ED QUICK NOTES
Orders for admission, patient is aware of plan and ready to go upstairs. Any questions, please call ED ISAC abarca at extension [phone].      Type of COVID test sent: Rapid negative  COVID Suspicion level: Low    LOC at time of transport: Alert and oriented x 4

## 2021-04-23 ENCOUNTER — APPOINTMENT (OUTPATIENT)
Dept: ULTRASOUND IMAGING | Facility: HOSPITAL | Age: 86
End: 2021-04-23
Attending: Other
Payer: MEDICARE

## 2021-04-23 ENCOUNTER — APPOINTMENT (OUTPATIENT)
Dept: MRI IMAGING | Facility: HOSPITAL | Age: 86
End: 2021-04-23
Attending: Other
Payer: MEDICARE

## 2021-04-23 VITALS
BODY MASS INDEX: 20.79 KG/M2 | HEIGHT: 63 IN | RESPIRATION RATE: 16 BRPM | OXYGEN SATURATION: 95 % | WEIGHT: 117.31 LBS | SYSTOLIC BLOOD PRESSURE: 127 MMHG | TEMPERATURE: 98 F | HEART RATE: 59 BPM | DIASTOLIC BLOOD PRESSURE: 61 MMHG

## 2021-04-23 PROCEDURE — 99213 OFFICE O/P EST LOW 20 MIN: CPT | Performed by: OTHER

## 2021-04-23 PROCEDURE — 99217 OBSERVATION CARE DISCHARGE: CPT | Performed by: HOSPITALIST

## 2021-04-23 PROCEDURE — 70544 MR ANGIOGRAPHY HEAD W/O DYE: CPT | Performed by: OTHER

## 2021-04-23 PROCEDURE — 93880 EXTRACRANIAL BILAT STUDY: CPT | Performed by: OTHER

## 2021-04-23 RX ORDER — ROSUVASTATIN CALCIUM 20 MG/1
40 TABLET, COATED ORAL NIGHTLY
Status: DISCONTINUED | OUTPATIENT
Start: 2021-04-23 | End: 2021-04-23

## 2021-04-23 RX ORDER — LOSARTAN POTASSIUM 100 MG/1
100 TABLET ORAL DAILY
Status: DISCONTINUED | OUTPATIENT
Start: 2021-04-23 | End: 2021-04-23

## 2021-04-23 RX ORDER — METOPROLOL SUCCINATE 100 MG/1
100 TABLET, EXTENDED RELEASE ORAL DAILY
Status: DISCONTINUED | OUTPATIENT
Start: 2021-04-23 | End: 2021-04-23

## 2021-04-23 RX ORDER — AMLODIPINE BESYLATE 5 MG/1
5 TABLET ORAL DAILY
Status: DISCONTINUED | OUTPATIENT
Start: 2021-04-23 | End: 2021-04-23

## 2021-04-23 RX ORDER — ASPIRIN 81 MG/1
81 TABLET, CHEWABLE ORAL DAILY
Status: DISCONTINUED | OUTPATIENT
Start: 2021-04-23 | End: 2021-04-23

## 2021-04-23 NOTE — PROGRESS NOTES
[facility]  [address] [facility], [address] [place] [address]  [phone]          INPATIENT NEUROLOGY   FOLLOW UP [facility]     Report of Consultation    Salomon Vasquez Patient Status:  Jose Manuel Ronquillo her TIA.   –MRI brain with no acute infarct or hemorrhage, chronic microvascular ischemic change  –LDL 45  --HgbA1C 6.0  --CUS - antegrade flow, no significant stenosis   –Echocardiogram: Mildly dilated left atrium, EF 55 to 60%, mild regurgitation of aorti

## 2021-04-23 NOTE — PROGRESS NOTES
[facility]    Progress Note    [address] Patient Status:  Observation    1929 MRN [id number]   Location [facility] 3W/SW Attending Keya Diggs MD   Hosp Day # 0 PCP Flower Peguero MD     HPI/Subjective:     Con noted. No significant change   Dictated by (CST): Jose Woodruff MD on 4/23/2021 at 8:50 AM     Finalized by (CST): Jose Woodruff MD on 4/23/2021 at 8:56 AM          CT STROKE BRAIN (NO IV)(CPT=70450)    Result Date: 4/22/2021  CONCLUSION:  1. daily, crestor 40mg daily, manage BP      Atrial fibrillation, new diagnosis  - unknown duration, discovered on admission to ED  - rates controlled. Cont Toprol 100mg daily.   - NYQ2AS4-SKSg = 4 - pt is high risk for cardioembolic CVA    - rec starting [employer]

## 2021-04-23 NOTE — ED PROVIDER NOTES
Patient Seen in: [facility] 3w/sw    History   Patient presents with:  Stroke    Stated Complaint: stroke    HPI    Patient complains of facial droop and some expressive aphasia, woke with symptoms > 4.5 hours PTA. No headache.   Seems a little be Smoker      Smokeless tobacco: Never Used    Alcohol use: No      Alcohol/week: 0.0 standard drinks    Drug use: No      Review of Systems    Positive for stated complaint: stroke  Other systems are as noted in HPI.   Constitutional and vital signs reviewed Sodium 135 (*)     BUN 22 (*)     BUN/CREA Ratio 23.4 (*)     GFR, Non- 53 (*)     All other components within normal limits   CBC W/ DIFFERENTIAL - Abnormal; Notable for the following components:    RDW-SD 46.6 (*)     All other components signs of acute large vessel infarction. 2. Nonspecific white matter changes involving both cerebral hemispheres that most likely reflect sequelae of chronic microangiopathy. 3. Intracranial atherosclerosis.  4. Nonspecific slight asymmetric enlargement of t Present on Admission  Date Reviewed: 3/28/2021        ICD-10-CM Noted POA    * (Principal) TIA (transient ischemic attack) G45.9 4/22/2021 Unknown

## 2021-04-23 NOTE — PLAN OF CARE
Problem: Patient Centered Care  Goal: Patient preferences are identified and integrated in the patient's plan of care  Description: Interventions:  - What would you like us to know as we care for you? I have four children and live with my daughter.   - Pr Encourage visually impaired, hearing impaired and aphasic patients to use assistive/communication devices  Outcome: Progressing     Problem: SAFETY ADULT - FALL  Goal: Free from fall injury  Description: INTERVENTIONS:  - Assess pt frequently for physical prophylaxis as ordered  - Provide education handouts and proof of immunizations to parent/legal guardian  - Facilitate outpatient follow-up appointments  - Consult Case Management and Social Work for medical and insurance needs  Outcome: Progressing     Pt

## 2021-04-23 NOTE — OCCUPATIONAL THERAPY NOTE
OCCUPATIONAL THERAPY EVALUATION - INPATIENT     Room Number: 335/335-A  Evaluation Date: 4/23/2021  Type of Evaluation: Initial  Presenting Problem:  Raymona Marizol)    Physician Order: IP Consult to Occupational Therapy  Reason for Therapy: ADL/IADL Dysfunction a DISCHARGE RECOMMENDATIONS  OT Discharge Recommendations: Home; Intermittent Supervision  OT Device Recommendations: Shower chair    PLAN    Patient has been evaluated and presents with no skilled Occupational Therapy needs  at this time.   Patient will b tasks    SENSATION  Pt denied numbness/tingling    Communication: intact;no episodes of word finding or intelligibility     Behavioral/Emotional/Social: pleasant and cooperative    RANGE OF MOTION   Upper extremity ROM is within functional limits     STREN

## 2021-04-23 NOTE — PLAN OF CARE
Problem: Patient Centered Care  Goal: Patient preferences are identified and integrated in the patient's plan of care  Description: Interventions:  - What would you like us to know as we care for you? I have four children and live with my daughter.   - Pr Encourage visually impaired, hearing impaired and aphasic patients to use assistive/communication devices  Outcome: Progressing     Problem: SAFETY ADULT - FALL  Goal: Free from fall injury  Description: INTERVENTIONS:  - Assess pt frequently for physical prophylaxis as ordered  - Provide education handouts and proof of immunizations to parent/legal guardian  - Facilitate outpatient follow-up appointments  - Consult Case Management and Social Work for medical and insurance needs  Outcome: Progressing

## 2021-04-23 NOTE — PROGRESS NOTES
Okay to stop aspirin. No further work-up per neurology. No indication for the patient to remain inpatient at this time. Okay to discharge per neurology.   Discharge disposition per primary

## 2021-04-23 NOTE — CM/SW NOTE
Patient failed inpatient criteria. Second level of review completed and supports observation. UR committee in agreement. Discussed with Dr. Li Martínez  who approves observation status. MOON given to the patient and order written.

## 2021-04-23 NOTE — SLP NOTE
SLP attempt for ongoing dysphagia intervention and SLE d/t concerns for TIA. RN reports patient tolerates diet well with no overt CSA. Pt out of room at the time of attempt for MRA BRAIN. SLP to f/u as patient available and as schedule allows.        Thank

## 2021-04-23 NOTE — PLAN OF CARE
Patient alert and oriented. Room air. Q4 neuros. Standby assist. No deficits noted. Call light within reach.    Problem: Patient Centered Care  Goal: Patient preferences are identified and integrated in the patient's plan of care  Description: Interventions neurological status  - Encourage and assist patient to increase activity and self care with guidance from PT/OT  - Encourage visually impaired, hearing impaired and aphasic patients to use assistive/communication devices  Outcome: Progressing     Problem: medications  - Build confidence of parent/family by encouraging them to provide cares  - Administer immunizations and RSV prophylaxis as ordered  - Provide education handouts and proof of immunizations to parent/legal guardian  - Facilitate outpatient foll

## 2021-04-23 NOTE — PHYSICAL THERAPY NOTE
PHYSICAL THERAPY EVALUATION - INPATIENT     Room Number: 335/335-A  Evaluation Date: 4/23/2021  Type of Evaluation: Initial   Physician Order: PT Eval and Treat    Presenting Problem: TIA, slurred speech  Reason for Therapy: Mobility Dysfunction and Disch mobility and has a strong family support system for discharge.      DISCHARGE RECOMMENDATIONS  PT Discharge Recommendations: 24 hour care/supervision;Home    PLAN    Patient has been evaluated and presents with no skilled Physical Therapy needs at this time cerebrovascular disease 10/31/15    chronic microvascular ischemic on MRI brain   • DJD (degenerative joint disease)    • Essential hypertension    • High blood pressure    • Left arm swelling 10/22/15    US no thrombosis RONNIE or internal jugular   • Left p from another person does the patient currently need. ..   -   Moving to and from a bed to a chair (including a wheelchair)?: A Little   -   Need to walk in hospital room?: A Little   -   Climbing 3-5 steps with a railing?: A Little     AM-PAC Score:  Raw Sc

## 2021-04-23 NOTE — DISCHARGE SUMMARY
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 This finding is likely related to atherosclerotic disease.    Remote - PS  Dictated by (CST): Feliberto Lozada MD on 4/23/2021 at 2:50 PM 04/23/2021    HGB 11.6 (L) 04/23/2021    HCT 34.9 (L) 04/23/2021    .0 04/23/2021    CREATSERUM 0.94 04/23/2021    BUN 22 (H) 04/23/2021     (L) 04/23/2021    K 4.0 04/23/2021     04/23/2021    CO2 31.0 04/23/2021    GLU 94 04/23/2021 tablet by mouth daily. Refills: 0     Rosuvastatin Calcium 40 MG Tabs  Commonly known as: CRESTOR      Take 1 tablet (40 mg total) by mouth nightly. Quantity: 90 tablet  Refills: 3     Vitamin D3 25 MCG (1000 UT) Caps      Take 1 tablet by mouth daily.

## 2021-04-26 ENCOUNTER — TELEPHONE (OUTPATIENT)
Dept: INTERNAL MEDICINE CLINIC | Facility: CLINIC | Age: 86
End: 2021-04-26

## 2021-04-26 ENCOUNTER — PATIENT OUTREACH (OUTPATIENT)
Dept: CASE MANAGEMENT | Age: 86
End: 2021-04-26

## 2021-04-26 DIAGNOSIS — G45.9 TIA (TRANSIENT ISCHEMIC ATTACK): ICD-10-CM

## 2021-04-26 DIAGNOSIS — Z02.9 ENCOUNTERS FOR UNSPECIFIED ADMINISTRATIVE PURPOSE: ICD-10-CM

## 2021-04-26 PROCEDURE — 1111F DSCHRG MED/CURRENT MED MERGE: CPT

## 2021-04-26 NOTE — PROGRESS NOTES
Initial Post Discharge Follow Up   Discharge Date: 4/23/21  Contact Date: 4/26/2021    Consent Verification:  Assessment Completed With: Patient  HIPAA Verified?   Yes    Discharge Dx:   TIA    General:   • How have you been since your discharge from the MOUTH DAILY 90 tablet 3   • Metoprolol Succinate  MG Oral Tablet 24 Hr Take 1 tablet (100 mg total) by mouth daily. 90 tablet 3   • Rosuvastatin Calcium 40 MG Oral Tab Take 1 tablet (40 mg total) by mouth nightly.  90 tablet 3   • losartan 100 MG Oral Oncology [facility]        Sep 28, 2021  4:20 PM CDT Follow Up Visit with Ashley Brito MD [facility], [facility], [address], [place] ([address])            [facility] Hematology Oncology  [address] [facility]

## 2021-04-26 NOTE — TELEPHONE ENCOUNTER
I can see  pt this  coming  fRFriday  at 4;20 or  4;40 PM, PLEASE CALL HER OR SHE CAN SEE  ANY AVAILABLE DOCTOR AT [facility]

## 2021-04-26 NOTE — TELEPHONE ENCOUNTER
Spoke to pt for TCM today. Pt does not have HFU appt scheduled at this time. NCM attempted to schedule an HFU appt with Dr. Pj Rothman however unable to due to schedule limitations.  Pt stated she needs a later afternoon appt as her daughter has to drive her a

## 2021-04-29 ENCOUNTER — TELEPHONE (OUTPATIENT)
Dept: CARDIOLOGY | Age: 86
End: 2021-04-29

## 2021-04-29 PROBLEM — N18.30 BENIGN HYPERTENSION WITH CKD (CHRONIC KIDNEY DISEASE) STAGE III (CMD): Status: ACTIVE | Noted: 2021-04-29

## 2021-04-29 PROBLEM — G45.9 TIA (TRANSIENT ISCHEMIC ATTACK): Status: ACTIVE | Noted: 2021-04-29

## 2021-04-29 PROBLEM — I48.19 PERSISTENT ATRIAL FIBRILLATION (CMD): Status: ACTIVE | Noted: 2021-04-29

## 2021-04-29 PROBLEM — I12.9 BENIGN HYPERTENSION WITH CKD (CHRONIC KIDNEY DISEASE) STAGE III (CMD): Status: ACTIVE | Noted: 2021-04-29

## 2021-04-29 PROBLEM — I10 ESSENTIAL HYPERTENSION: Status: ACTIVE | Noted: 2021-04-29

## 2021-04-29 PROBLEM — E78.00 PURE HYPERCHOLESTEROLEMIA: Status: ACTIVE | Noted: 2021-04-29

## 2021-04-29 NOTE — PROGRESS NOTES
[facility] Patient Status:  No patient class for patient encounter    1929 MRN [id number]   Location MD Dr. Chavo Conroy is a 80year old f TP 8.9 (H) 10/13/2020 09:15 AM    TP 8.7 (H) 10/13/2020 09:15 AM    AST 36 10/13/2020 09:15 AM    ALT 31 10/13/2020 09:15 AM    T4F 0.8 10/13/2020 09:15 AM    TSH 4.250 (H) 10/13/2020 09:15 AM    MG 2.2 09/03/2020 02:05 PM    TROP <0.045 09/03/2020 03:5 recommended, pt declined    Plan:   Continue all your same medications    Call if having any dizziness, lightheadedness, heart racing, palpitations, chest pain, shortness of breath, coughing,  wheezing, swelling, weight gain,  or weakness    Weigh yourself Oral Tablet 24 Hr, Take 1 tablet (100 mg total) by mouth daily. , Disp: 90 tablet, Rfl: 3  •  Rosuvastatin Calcium 40 MG Oral Tab, Take 1 tablet (40 mg total) by mouth nightly., Disp: 90 tablet, Rfl: 3  •  losartan 100 MG Oral Tab, Take 1 tablet (100 mg tot

## 2021-04-30 ENCOUNTER — OFFICE VISIT (OUTPATIENT)
Dept: CARDIOLOGY CLINIC | Facility: HOSPITAL | Age: 86
End: 2021-04-30
Attending: NURSE PRACTITIONER
Payer: MEDICARE

## 2021-04-30 ENCOUNTER — OFFICE VISIT (OUTPATIENT)
Dept: INTERNAL MEDICINE CLINIC | Facility: CLINIC | Age: 86
End: 2021-04-30
Payer: MEDICARE

## 2021-04-30 VITALS
HEART RATE: 78 BPM | TEMPERATURE: 97 F | DIASTOLIC BLOOD PRESSURE: 80 MMHG | WEIGHT: 123.19 LBS | BODY MASS INDEX: 21.83 KG/M2 | SYSTOLIC BLOOD PRESSURE: 152 MMHG | HEIGHT: 63 IN

## 2021-04-30 VITALS
HEART RATE: 66 BPM | BODY MASS INDEX: 22 KG/M2 | OXYGEN SATURATION: 96 % | SYSTOLIC BLOOD PRESSURE: 118 MMHG | WEIGHT: 122 LBS | DIASTOLIC BLOOD PRESSURE: 70 MMHG

## 2021-04-30 DIAGNOSIS — I48.19 PERSISTENT ATRIAL FIBRILLATION (HCC): Primary | Chronic | ICD-10-CM

## 2021-04-30 DIAGNOSIS — I10 ESSENTIAL HYPERTENSION: ICD-10-CM

## 2021-04-30 DIAGNOSIS — G45.9 TIA (TRANSIENT ISCHEMIC ATTACK): ICD-10-CM

## 2021-04-30 DIAGNOSIS — I48.91 ATRIAL FIBRILLATION (HCC): ICD-10-CM

## 2021-04-30 DIAGNOSIS — D63.8 ANEMIA OF CHRONIC DISEASE: ICD-10-CM

## 2021-04-30 DIAGNOSIS — I48.21 PERMANENT ATRIAL FIBRILLATION (HCC): Primary | ICD-10-CM

## 2021-04-30 LAB
ALBUMIN SERPL-MCNC: 3.1 G/DL
ALP SERPL-CCNC: 80 U/L
ALT SERPL-CCNC: 22 UNITS/L
ANION GAP SERPL CALC-SCNC: 5 MMOL/L
AST SERPL-CCNC: 25 UNITS/L
BILIRUB SERPL-MCNC: 0.4 MG/DL
BUN SERPL-MCNC: 21 MG/DL
BUN/CREAT SERPL: 23.1
CALCIUM SERPL-MCNC: 8.8 MG/DL
CHLORIDE SERPL-SCNC: 98 MMOL/L
CO2 SERPL-SCNC: 29 MMOL/L
CREAT SERPL-MCNC: 0.91 MG/DL
GLOBULIN SER-MCNC: 5.3 G/DL
GLUCOSE SERPL-MCNC: 90 MG/DL
POTASSIUM SERPL-SCNC: 4.5 MMOL/L
PROT SERPL-MCNC: 8.4 G/DL
SODIUM SERPL-SCNC: 132 MMOL/L
TSH SERPL-ACNC: 2.28 MCUNITS/ML

## 2021-04-30 PROCEDURE — 1111F DSCHRG MED/CURRENT MED MERGE: CPT | Performed by: INTERNAL MEDICINE

## 2021-04-30 PROCEDURE — 99495 TRANSJ CARE MGMT MOD F2F 14D: CPT | Performed by: INTERNAL MEDICINE

## 2021-04-30 PROCEDURE — 99203 OFFICE O/P NEW LOW 30 MIN: CPT | Performed by: NURSE PRACTITIONER

## 2021-04-30 PROCEDURE — 93010 ELECTROCARDIOGRAM REPORT: CPT | Performed by: NURSE PRACTITIONER

## 2021-04-30 PROCEDURE — 84443 ASSAY THYROID STIM HORMONE: CPT | Performed by: NURSE PRACTITIONER

## 2021-04-30 PROCEDURE — 93005 ELECTROCARDIOGRAM TRACING: CPT

## 2021-04-30 PROCEDURE — 80053 COMPREHEN METABOLIC PANEL: CPT | Performed by: NURSE PRACTITIONER

## 2021-04-30 PROCEDURE — 99204 OFFICE O/P NEW MOD 45 MIN: CPT | Performed by: NURSE PRACTITIONER

## 2021-04-30 PROCEDURE — 36415 COLL VENOUS BLD VENIPUNCTURE: CPT | Performed by: NURSE PRACTITIONER

## 2021-05-01 NOTE — PROGRESS NOTES
HPI:    Yanira Abraham is a 80year old female here today for hospital follow up.    She was discharged from Inpatient hospital, [facility]  to Home   Admission Date: 4/22/21   Discharge Date: 4/23/21  Hospital Discharge Diagnoses (since 3/31/2021) atrial fibrillation, she was evaluated by cardiologist and neurologist recommendations were made for anticoagulation with Eliquis 2.5 mg twice a day.   Letter today patient feels well all symptoms resolved, daughter states that patient even feels stronger t heartbeat/palpitations  Respiratory:  Negative for cough, dyspnea and wheezing.   Eyes:  Negative for eye discharge and vision loss  Endocrine:  Negative for polydipsia and polyphagia  Integumentary:  Negative for pruritus and rash  Neurological:  Negative uncontrolled today due to whitecoat hypertension  Plan: Continue losartan and metoprolol    (D63.8) Anemia of chronic disease clinically stable  Plan:  We will continue to monitor    Meds & Refills for this Visit:  Requested Prescriptions      No prescripti

## 2021-05-05 NOTE — TELEPHONE ENCOUNTER
Fax received. New rx request:    •  AMLODIPINE BESYLATE 5 MG Oral Tab, TAKE 1 TABLET(5 MG) BY MOUTH DAILY, Disp: 90 tablet, Rfl: 3    •  Metoprolol Succinate  MG Oral Tablet 24 Hr, Take 1 tablet (100 mg total) by mouth daily. , Disp: 90 tablet, Rfl: 3

## 2021-05-05 NOTE — TELEPHONE ENCOUNTER
Refill request on the following. Current Outpatient Medications   Medication Sig Dispense Refill   •       •       •       •       •       • Rosuvastatin Calcium 40 MG Oral Tab Take 1 tablet (40 mg total) by mouth nightly.  90 tablet 3

## 2021-05-06 RX ORDER — METOPROLOL SUCCINATE 100 MG/1
100 TABLET, EXTENDED RELEASE ORAL DAILY
Qty: 90 TABLET | Refills: 3 | Status: SHIPPED | OUTPATIENT
Start: 2021-05-06

## 2021-05-06 RX ORDER — ROSUVASTATIN CALCIUM 40 MG/1
40 TABLET, COATED ORAL NIGHTLY
Qty: 90 TABLET | Refills: 3 | Status: SHIPPED | OUTPATIENT
Start: 2021-05-06

## 2021-05-06 RX ORDER — LOSARTAN POTASSIUM 100 MG/1
100 TABLET ORAL DAILY
Qty: 90 TABLET | Refills: 3 | Status: SHIPPED | OUTPATIENT
Start: 2021-05-06 | End: 2022-01-17

## 2021-05-06 RX ORDER — AMLODIPINE BESYLATE 5 MG/1
5 TABLET ORAL DAILY
Qty: 90 TABLET | Refills: 3 | Status: SHIPPED | OUTPATIENT
Start: 2021-05-06

## 2021-05-10 ENCOUNTER — OFFICE VISIT (OUTPATIENT)
Dept: CARDIOLOGY | Age: 86
End: 2021-05-10

## 2021-05-10 ENCOUNTER — TELEPHONE (OUTPATIENT)
Dept: CARDIOLOGY | Age: 86
End: 2021-05-10

## 2021-05-10 VITALS
HEIGHT: 63 IN | SYSTOLIC BLOOD PRESSURE: 142 MMHG | WEIGHT: 125 LBS | HEART RATE: 64 BPM | OXYGEN SATURATION: 95 % | DIASTOLIC BLOOD PRESSURE: 86 MMHG | BODY MASS INDEX: 22.15 KG/M2

## 2021-05-10 DIAGNOSIS — N18.30 BENIGN HYPERTENSION WITH CKD (CHRONIC KIDNEY DISEASE) STAGE III (CMD): ICD-10-CM

## 2021-05-10 DIAGNOSIS — I12.9 BENIGN HYPERTENSION WITH CKD (CHRONIC KIDNEY DISEASE) STAGE III (CMD): ICD-10-CM

## 2021-05-10 DIAGNOSIS — E78.00 PURE HYPERCHOLESTEROLEMIA: ICD-10-CM

## 2021-05-10 DIAGNOSIS — I48.19 PERSISTENT ATRIAL FIBRILLATION (CMD): ICD-10-CM

## 2021-05-10 DIAGNOSIS — G45.9 TIA (TRANSIENT ISCHEMIC ATTACK): Primary | ICD-10-CM

## 2021-05-10 PROCEDURE — 93000 ELECTROCARDIOGRAM COMPLETE: CPT | Performed by: NURSE PRACTITIONER

## 2021-05-10 PROCEDURE — 99214 OFFICE O/P EST MOD 30 MIN: CPT | Performed by: NURSE PRACTITIONER

## 2021-05-10 ASSESSMENT — PATIENT HEALTH QUESTIONNAIRE - PHQ9
SUM OF ALL RESPONSES TO PHQ9 QUESTIONS 1 AND 2: 0
SUM OF ALL RESPONSES TO PHQ9 QUESTIONS 1 AND 2: 0
1. LITTLE INTEREST OR PLEASURE IN DOING THINGS: NOT AT ALL
2. FEELING DOWN, DEPRESSED OR HOPELESS: NOT AT ALL
CLINICAL INTERPRETATION OF PHQ9 SCORE: NO FURTHER SCREENING NEEDED
CLINICAL INTERPRETATION OF PHQ2 SCORE: NO FURTHER SCREENING NEEDED

## 2021-05-24 ENCOUNTER — OFFICE VISIT (OUTPATIENT)
Dept: CARDIOLOGY | Age: 86
End: 2021-05-24

## 2021-05-24 VITALS
HEIGHT: 63 IN | DIASTOLIC BLOOD PRESSURE: 80 MMHG | OXYGEN SATURATION: 97 % | WEIGHT: 122 LBS | BODY MASS INDEX: 21.62 KG/M2 | SYSTOLIC BLOOD PRESSURE: 142 MMHG | HEART RATE: 95 BPM

## 2021-05-24 DIAGNOSIS — I10 ESSENTIAL HYPERTENSION: Primary | ICD-10-CM

## 2021-05-24 DIAGNOSIS — E78.00 PURE HYPERCHOLESTEROLEMIA: ICD-10-CM

## 2021-05-24 DIAGNOSIS — I48.19 PERSISTENT ATRIAL FIBRILLATION (CMD): ICD-10-CM

## 2021-05-24 PROCEDURE — 99214 OFFICE O/P EST MOD 30 MIN: CPT | Performed by: INTERNAL MEDICINE

## 2021-05-24 ASSESSMENT — PATIENT HEALTH QUESTIONNAIRE - PHQ9
CLINICAL INTERPRETATION OF PHQ2 SCORE: NO FURTHER SCREENING NEEDED
1. LITTLE INTEREST OR PLEASURE IN DOING THINGS: NOT AT ALL
SUM OF ALL RESPONSES TO PHQ9 QUESTIONS 1 AND 2: 0
SUM OF ALL RESPONSES TO PHQ9 QUESTIONS 1 AND 2: 0
2. FEELING DOWN, DEPRESSED OR HOPELESS: NOT AT ALL
CLINICAL INTERPRETATION OF PHQ9 SCORE: NO FURTHER SCREENING NEEDED

## 2021-05-28 ENCOUNTER — TELEPHONE (OUTPATIENT)
Dept: CARDIOLOGY | Age: 86
End: 2021-05-28

## 2021-06-21 ENCOUNTER — TELEPHONE (OUTPATIENT)
Dept: CARDIOLOGY | Age: 86
End: 2021-06-21

## 2021-08-20 ENCOUNTER — TELEPHONE (OUTPATIENT)
Dept: HEMATOLOGY/ONCOLOGY | Facility: HOSPITAL | Age: 86
End: 2021-08-20

## 2021-08-23 ENCOUNTER — APPOINTMENT (OUTPATIENT)
Dept: HEMATOLOGY/ONCOLOGY | Facility: HOSPITAL | Age: 86
End: 2021-08-23
Attending: INTERNAL MEDICINE
Payer: MEDICARE

## 2021-08-23 NOTE — PROGRESS NOTES
Cancer Center Progress Note    Patient Name: Mar Wyatt   YOB: 1929   Medical Record Number: [id number]   Attending Physician: Ruthie Macias M.D.      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 , Disp: 60 tablet, Rfl: 0  •  Cholecalciferol (VITAMIN D3) 25 MCG (1000 UT) Oral Cap, Take 1 tablet by mouth daily. , Disp: , Rfl:   •  Multiple Vitamin (ONE-DAILY MULTI VITAMINS) Oral Tab, Take 1 tablet by mouth daily

## 2021-08-26 ENCOUNTER — LAB ENCOUNTER (OUTPATIENT)
Dept: LAB | Age: 86
End: 2021-08-26
Attending: INTERNAL MEDICINE
Payer: MEDICARE

## 2021-08-26 DIAGNOSIS — D47.2 MGUS (MONOCLONAL GAMMOPATHY OF UNKNOWN SIGNIFICANCE): ICD-10-CM

## 2021-08-26 LAB
ALBUMIN SERPL-MCNC: 3.5 G/DL (ref 3.4–5)
ALBUMIN/GLOB SERPL: 0.6 {RATIO} (ref 1–2)
ALP LIVER SERPL-CCNC: 80 U/L
ALT SERPL-CCNC: 42 U/L
ANION GAP SERPL CALC-SCNC: 7 MMOL/L (ref 0–18)
AST SERPL-CCNC: 35 U/L (ref 15–37)
BASOPHILS # BLD AUTO: 0.05 X10(3) UL (ref 0–0.2)
BASOPHILS NFR BLD AUTO: 0.6 %
BILIRUB SERPL-MCNC: 0.7 MG/DL (ref 0.1–2)
BUN BLD-MCNC: 19 MG/DL (ref 7–18)
BUN/CREAT SERPL: 22.1 (ref 10–20)
CALCIUM BLD-MCNC: 9.3 MG/DL (ref 8.5–10.1)
CHLORIDE SERPL-SCNC: 100 MMOL/L (ref 98–112)
CO2 SERPL-SCNC: 26 MMOL/L (ref 21–32)
CREAT BLD-MCNC: 0.86 MG/DL
DEPRECATED RDW RBC AUTO: 49.8 FL (ref 35.1–46.3)
EOSINOPHIL # BLD AUTO: 0.04 X10(3) UL (ref 0–0.7)
EOSINOPHIL NFR BLD AUTO: 0.5 %
ERYTHROCYTE [DISTWIDTH] IN BLOOD BY AUTOMATED COUNT: 14.1 % (ref 11–15)
GLOBULIN PLAS-MCNC: 5.7 G/DL (ref 2.8–4.4)
GLUCOSE BLD-MCNC: 92 MG/DL (ref 70–99)
HCT VFR BLD AUTO: 37.8 %
HGB BLD-MCNC: 12.3 G/DL
IGA SERPL-MCNC: 78.4 MG/DL (ref 70–312)
IGM SERPL-MCNC: 21.2 MG/DL (ref 43–279)
IMM GRANULOCYTES # BLD AUTO: 0.02 X10(3) UL (ref 0–1)
IMM GRANULOCYTES NFR BLD: 0.3 %
IMMUNOGLOBULIN PNL SER-MCNC: 3110 MG/DL (ref 791–1643)
LYMPHOCYTES # BLD AUTO: 3.87 X10(3) UL (ref 1–4)
LYMPHOCYTES NFR BLD AUTO: 50 %
M PROTEIN MFR SERPL ELPH: 9.2 G/DL (ref 6.4–8.2)
MCH RBC QN AUTO: 31 PG (ref 26–34)
MCHC RBC AUTO-ENTMCNC: 32.5 G/DL (ref 31–37)
MCV RBC AUTO: 95.2 FL
MONOCYTES # BLD AUTO: 0.53 X10(3) UL (ref 0.1–1)
MONOCYTES NFR BLD AUTO: 6.8 %
NEUTROPHILS # BLD AUTO: 3.23 X10 (3) UL (ref 1.5–7.7)
NEUTROPHILS # BLD AUTO: 3.23 X10(3) UL (ref 1.5–7.7)
NEUTROPHILS NFR BLD AUTO: 41.8 %
OSMOLALITY SERPL CALC.SUM OF ELEC: 278 MOSM/KG (ref 275–295)
PATIENT FASTING Y/N/NP: YES
PLATELET # BLD AUTO: 208 10(3)UL (ref 150–450)
POTASSIUM SERPL-SCNC: 3.8 MMOL/L (ref 3.5–5.1)
RBC # BLD AUTO: 3.97 X10(6)UL
SODIUM SERPL-SCNC: 133 MMOL/L (ref 136–145)
WBC # BLD AUTO: 7.7 X10(3) UL (ref 4–11)

## 2021-08-26 PROCEDURE — 80053 COMPREHEN METABOLIC PANEL: CPT

## 2021-08-26 PROCEDURE — 86334 IMMUNOFIX E-PHORESIS SERUM: CPT

## 2021-08-26 PROCEDURE — 82784 ASSAY IGA/IGD/IGG/IGM EACH: CPT

## 2021-08-26 PROCEDURE — 83883 ASSAY NEPHELOMETRY NOT SPEC: CPT

## 2021-08-26 PROCEDURE — 36415 COLL VENOUS BLD VENIPUNCTURE: CPT

## 2021-08-26 PROCEDURE — 85025 COMPLETE CBC W/AUTO DIFF WBC: CPT

## 2021-08-26 PROCEDURE — 84165 PROTEIN E-PHORESIS SERUM: CPT

## 2021-08-30 LAB
ALBUMIN SERPL ELPH-MCNC: 4.11 G/DL (ref 3.75–5.21)
ALBUMIN/GLOB SERPL: 0.89 {RATIO} (ref 1–2)
ALPHA1 GLOB SERPL ELPH-MCNC: 0.34 G/DL (ref 0.19–0.46)
ALPHA2 GLOB SERPL ELPH-MCNC: 0.88 G/DL (ref 0.48–1.05)
B-GLOBULIN SERPL ELPH-MCNC: 0.74 G/DL (ref 0.68–1.23)
GAMMA GLOB SERPL ELPH-MCNC: 2.64 G/DL (ref 0.62–1.7)
KAPPA LC FREE SER-MCNC: 10.43 MG/DL (ref 0.33–1.94)
KAPPA LC FREE/LAMBDA FREE SER NEPH: 9.8 {RATIO} (ref 0.26–1.65)
LAMBDA LC FREE SERPL-MCNC: 1.06 MG/DL (ref 0.57–2.63)
M PROTEIN MFR SERPL ELPH: 8.7 G/DL (ref 6.4–8.2)
M-SPIKE 1: 2.08 G/DL (ref ?–0)

## 2021-09-01 ENCOUNTER — OFFICE VISIT (OUTPATIENT)
Dept: HEMATOLOGY/ONCOLOGY | Facility: HOSPITAL | Age: 86
End: 2021-09-01
Attending: INTERNAL MEDICINE
Payer: MEDICARE

## 2021-09-01 VITALS
HEIGHT: 63 IN | DIASTOLIC BLOOD PRESSURE: 59 MMHG | HEART RATE: 77 BPM | SYSTOLIC BLOOD PRESSURE: 150 MMHG | WEIGHT: 121 LBS | BODY MASS INDEX: 21.44 KG/M2 | RESPIRATION RATE: 16 BRPM | TEMPERATURE: 98 F | OXYGEN SATURATION: 98 %

## 2021-09-01 DIAGNOSIS — D47.2 MGUS (MONOCLONAL GAMMOPATHY OF UNKNOWN SIGNIFICANCE): Primary | ICD-10-CM

## 2021-09-01 DIAGNOSIS — G45.9 TIA (TRANSIENT ISCHEMIC ATTACK): ICD-10-CM

## 2021-09-01 PROCEDURE — 99214 OFFICE O/P EST MOD 30 MIN: CPT | Performed by: INTERNAL MEDICINE

## 2021-09-01 NOTE — PROGRESS NOTES
Cancer Center Progress Note    Patient Name: Alis Ashton   YOB: 1929   Medical Record Number: [id number]   Attending Physician: Olivia Newman M.D.      Chief Complaint:  MGUS    Oncology History:  80year old seen due to IgG MGUS with M sp Tab, Take 1 tablet (2.5 mg total) by mouth 2 (two) times daily. , Disp: 60 tablet, Rfl: 0  •  Cholecalciferol (VITAMIN D3) 25 MCG (1000 UT) Oral Cap, Take 1 tablet by mouth daily. , Disp: , Rfl:   •  Multiple Vitamin (ONE-DAILY MULTI VITAMINS) Oral Tab, Take appear stable. CBC/CMP without obvious end-organ involvement  - Discuss given the stability of her MGUS profile over the last 4+ years it is unlikely to significantly change.     - Given her other comorbidities and age it is unlikely that further evaluation

## 2021-09-27 ENCOUNTER — APPOINTMENT (OUTPATIENT)
Dept: CARDIOLOGY | Age: 86
End: 2021-09-27

## 2021-09-28 ENCOUNTER — TELEPHONE (OUTPATIENT)
Dept: INTERNAL MEDICINE CLINIC | Facility: CLINIC | Age: 86
End: 2021-09-28

## 2021-09-28 ENCOUNTER — MED REC SCAN ONLY (OUTPATIENT)
Dept: INTERNAL MEDICINE CLINIC | Facility: CLINIC | Age: 86
End: 2021-09-28

## 2021-09-28 ENCOUNTER — OFFICE VISIT (OUTPATIENT)
Dept: INTERNAL MEDICINE CLINIC | Facility: CLINIC | Age: 86
End: 2021-09-28
Payer: MEDICARE

## 2021-09-28 VITALS
WEIGHT: 122.81 LBS | RESPIRATION RATE: 20 BRPM | HEIGHT: 63 IN | HEART RATE: 71 BPM | DIASTOLIC BLOOD PRESSURE: 64 MMHG | BODY MASS INDEX: 21.76 KG/M2 | SYSTOLIC BLOOD PRESSURE: 121 MMHG

## 2021-09-28 DIAGNOSIS — M54.16 LUMBAR RADICULOPATHY: Primary | ICD-10-CM

## 2021-09-28 PROCEDURE — 99214 OFFICE O/P EST MOD 30 MIN: CPT | Performed by: INTERNAL MEDICINE

## 2021-09-28 RX ORDER — GABAPENTIN 100 MG/1
100 CAPSULE ORAL 3 TIMES DAILY
Qty: 90 CAPSULE | Refills: 0 | Status: SHIPPED | OUTPATIENT
Start: 2021-09-28 | End: 2021-09-28

## 2021-09-28 RX ORDER — GABAPENTIN 100 MG/1
100 CAPSULE ORAL 3 TIMES DAILY
Qty: 270 CAPSULE | Refills: 0 | Status: SHIPPED | OUTPATIENT
Start: 2021-09-28 | End: 2021-10-14

## 2021-09-28 NOTE — TELEPHONE ENCOUNTER
LUIS.  When pt calls back pls inform her that I have sent the prescription for gabapentin to her local pharmacy. We will also cancel the prescription to her mail order.

## 2021-09-29 ENCOUNTER — HOSPITAL ENCOUNTER (OUTPATIENT)
Dept: MRI IMAGING | Age: 86
Discharge: HOME | End: 2021-09-29
Attending: INTERNAL MEDICINE
Payer: MEDICARE

## 2021-09-29 DIAGNOSIS — M54.16 LUMBAR RADICULOPATHY: ICD-10-CM

## 2021-09-29 PROCEDURE — 72148 MRI LUMBAR SPINE W/O DYE: CPT | Performed by: INTERNAL MEDICINE

## 2021-09-29 NOTE — PROGRESS NOTES
Subjective:   Patient ID: Ibis Diez is a 80year old female. Presents for evaluation of the low back pain.     HPI  Patient reports that for many years she has been having low back pain radiating down to the right leg, lately pain is much more sever rash or ecchymosis. Mild puffiness around external malleolus bilaterally both ankles. Straight leg raising is negative, deep tendon reflexes diminished on the knees.   Patient walks with a cane  Assessment & Plan:   Lumbar radiculopathy  (primary encounte

## 2021-10-06 ENCOUNTER — TELEPHONE (OUTPATIENT)
Dept: INTERNAL MEDICINE CLINIC | Facility: CLINIC | Age: 86
End: 2021-10-06

## 2021-10-14 RX ORDER — GABAPENTIN 100 MG/1
100 CAPSULE ORAL 3 TIMES DAILY
Qty: 270 CAPSULE | Refills: 1 | Status: SHIPPED | OUTPATIENT
Start: 2021-10-14

## 2021-10-14 NOTE — TELEPHONE ENCOUNTER
Refill passed per [employer] protocol. Requested Prescriptions   Pending Prescriptions Disp Refills    gabapentin 100 MG Oral Cap 270 capsule 0     Sig: Take 1 capsule (100 mg total) by mouth 3 (three) times daily.         Neurology Medications Passed - 10/14/2021  1:56 PM        Passed - Appointment in the past 6 or next 3 months              Future Appointments         Provider Department Appt Notes    In 10 months Uvaldo Sherwood 19 Hematology Oncology F/U            Recent Outpatient Visits              2 weeks ago Lumbar radiculopathy    [facility], [address], [address] MD    Office Visit    1 month ago MGUS (monoclonal gammopathy of unknown significance)    [facility] Hematology Oncology Willie Fuentes MD    Office Visit    5 months ago Permanent atrial fibrillation [facility])    [facility],  Luann Castle MD    Office Visit    5 months ago Persistent atrial fibrillation [facility])    [address] [place] [address], NP    Office Visit    6 months ago Essential hypertension    Alger Luann Delong MD    Office Visit

## 2021-10-18 ENCOUNTER — TELEPHONE (OUTPATIENT)
Dept: INTERNAL MEDICINE CLINIC | Facility: CLINIC | Age: 86
End: 2021-10-18

## 2021-10-18 NOTE — TELEPHONE ENCOUNTER
Pt's daughter is calling with pt present. States that she has been taking one gabapentin in the am and increased to another one in the evening, but found that she gets too tired with the second dose.   Pt is wanting to know if she can take the gabapentin i

## 2022-01-06 ENCOUNTER — TELEPHONE (OUTPATIENT)
Dept: INTERNAL MEDICINE CLINIC | Facility: CLINIC | Age: 87
End: 2022-01-06

## 2022-01-06 NOTE — TELEPHONE ENCOUNTER
Pt's daughter, Tammi Leslie verified, calls and states that gabapentin is not working. Pt was on the call as well. +pain, not moving around a lot. Soreness in lower back, right side. Same pain as before, no improvement.      Patient is taking medicat

## 2022-01-06 NOTE — TELEPHONE ENCOUNTER
Spoke to stacey and her daughter, she wants to continue with gabapentin because it slightly helps with the pain, will add Tylenol 500 mg every 8 hours, she has follow-up appointment in 2 weeks.   Advised that patient can go ahead and should get the booster

## 2022-01-17 RX ORDER — LOSARTAN POTASSIUM 100 MG/1
100 TABLET ORAL DAILY
Qty: 90 TABLET | Refills: 1 | Status: SHIPPED | OUTPATIENT
Start: 2022-01-17 | End: 2022-07-30

## 2022-01-17 NOTE — TELEPHONE ENCOUNTER
Refill passed per [employer] protocol. Requested Prescriptions   Pending Prescriptions Disp Refills    LOSARTAN 100 MG Oral Tab [Pharmacy Med Name: LOSARTAN POTASSIUM 100 MG Tablet] 90 tablet 3     Sig: Take 1 tablet (100 mg total) by mouth daily.         Hypertensive Medications Protocol Passed - 1/17/2022 10:56 AM        Passed - CMP or BMP in past 12 months        Passed - Appointment in past 6 or next 3 months        Passed - GFR Non- > 50     Lab Results   Component Value Date    GFRNAA 61 (L) 08/26/2021                        Future Appointments         Provider Department Appt Notes    In 7 months Uvaldo Tinoco 19 Hematology Oncology F/U            Recent Outpatient Visits              3 months ago [facility]bar radiculopathy    [facility], [address] [place], [place]    Office Visit    4 months ago MGUS (monoclonal gammopathy of unknown significance)    [facility] Hematology Oncology Fernando Kent MD    Office Visit    8 months ago Permanent atrial fibrillation [facility])    [facility], [address] Montiel MD    Office Visit    8 months ago Persistent atrial fibrillation [facility])    [address] [place] [address], NP    Office Visit    10 months ago Essential hypertension    Adi Matos [place], MD    Office Visit

## 2022-02-14 RX ORDER — AMLODIPINE BESYLATE 5 MG/1
5 TABLET ORAL DAILY
Qty: 90 TABLET | Refills: 1 | Status: SHIPPED | OUTPATIENT
Start: 2022-02-14

## 2022-02-14 NOTE — TELEPHONE ENCOUNTER
Refill passed per [address] [facility] protocol. Requested Prescriptions   Pending Prescriptions Disp Refills    AMLODIPINE 5 MG Oral Tab [Pharmacy Med Name: AMLODIPINE BESYLATE 5 MG Tablet] 90 tablet 3     Sig: Take 1 tablet (5 mg total) by mouth daily.         Hypertensive Medications Protocol Passed - 2/14/2022 11:53 AM        Passed - CMP or BMP in past 12 months        Passed - Appointment in past 6 or next 3 months        Passed - GFR Non- > 50     Lab Results   Component Value Date    GFRNAA 61 (L) 08/26/2021                        Recent Outpatient Visits              4 months ago Lumbar radiculopathy    [facility] [place]    Office Visit    5 months ago MGUS (monoclonal gammopathy of unknown significance)    [facility] Hematology Oncology Marbella Duval MD    Office Visit    9 months ago Permanent atrial fibrillation [facility])    [facility], [address] [place] [address], You Vogt MD    Office Visit    9 months ago Persistent atrial fibrillation [facility])    [address], NP    Office Visit    10 months ago Essential hypertension    Rosaura Ellis [place], MD    Office Visit            Future Appointments         Provider Department Appt Notes    In 6 months Uvaldo Meredith 19 Hematology Oncology F/U

## 2022-02-23 RX ORDER — ROSUVASTATIN CALCIUM 40 MG/1
40 TABLET, COATED ORAL NIGHTLY
Qty: 90 TABLET | Refills: 1 | Status: SHIPPED | OUTPATIENT
Start: 2022-02-23

## 2022-02-23 NOTE — TELEPHONE ENCOUNTER
Refill passed per [employer] [facility] protocol. Requested Prescriptions   Pending Prescriptions Disp Refills    ROSUVASTATIN 40 MG Oral Tab [Pharmacy Med Name: ROSUVASTATIN CALCIUM 40 MG Tablet] 90 tablet 3     Sig: Take 1 tablet (40 mg total) by mouth nightly.         Cholesterol Medication Protocol Passed - 2/23/2022  7:07 AM        Passed - ALT in past 12 months        Passed - LDL in past 12 months        Passed - Last ALT < 80       Lab Results   Component Value Date    ALT 42 08/26/2021             Passed - Last LDL < 130     Lab Results   Component Value Date    LDL 45 04/22/2021               Passed - Appointment in past 12 or next 3 months               Recent Outpatient Visits              4 months ago Lumbar radiculopathy    [facility], [address] [place], MD    Office Visit    5 months ago MGUS (monoclonal gammopathy of unknown significance)    [facility] Hematology Oncology Avi Gunter MD    Office Visit    9 months ago Permanent atrial fibrillation [facility])    [place] [facility] [place], [address] Joh[address] Cas Javier MD    Office Visit    9 months ago Persistent atrial fibrillation [facility])    [address] [place] [address], NP    Office Visit    11 months ago Essential hypertension    Cas Go MD    Office Visit            Future Appointments         Provider Department Appt Notes    In 6 months Uvaldo Domínguez 19 Hematology Oncology F/U

## 2022-03-14 NOTE — TELEPHONE ENCOUNTER
Left message to call back. Please transfer to triage. 1st attempt. Does pt need this medication to be sent to her local pharmacy.  Pt had been sent to her [employer]

## 2022-03-15 RX ORDER — LOSARTAN POTASSIUM 100 MG/1
TABLET ORAL
Qty: 90 TABLET | Refills: 1 | OUTPATIENT
Start: 2022-03-15

## 2022-03-15 NOTE — TELEPHONE ENCOUNTER
Await pt reply, pt is not mychart active. pls advise, thanks in advance.          Future Appointments   Date Time Provider Andree Noe   8/31/2022  4:30 PM Rachel Castellanos MD [facility] HEM ONC EMO

## 2022-03-24 ENCOUNTER — TELEPHONE (OUTPATIENT)
Dept: INTERNAL MEDICINE CLINIC | Facility: CLINIC | Age: 87
End: 2022-03-24

## 2022-03-24 NOTE — TELEPHONE ENCOUNTER
Patient states that she was advised by Dr. Kendall Arceo to be seen for her back pain. She did not come in January because the weather was bad. She also cannot easily come into the office because her daughter has to drive her. Appointment made. Advised to go to ER if symptoms worsen. She verbalized understanding. Future Appointments   Date Time Provider Andree Noe   3/30/2022  3:40 PM Polo Laughlin MD [facility] [place]   8/31/2022  4:30 PM Manohar Hernandez MD [address] [place] [address] ONC Alejandrina Maldonado MD  1/6/22 4:44 PM  Note  Spoke to paticlari and her daughter, she wants to continue with gabapentin because it slightly helps with the pain, will add Tylenol 500 mg every 8 hours, she has follow-up appointment in 2 weeks.   Advised that patient can go ahead and should get the booster shot

## 2022-03-30 ENCOUNTER — OFFICE VISIT (OUTPATIENT)
Dept: INTERNAL MEDICINE CLINIC | Facility: CLINIC | Age: 87
End: 2022-03-30
Payer: MEDICARE

## 2022-03-30 VITALS
BODY MASS INDEX: 21.26 KG/M2 | SYSTOLIC BLOOD PRESSURE: 148 MMHG | DIASTOLIC BLOOD PRESSURE: 76 MMHG | WEIGHT: 120 LBS | HEART RATE: 59 BPM | HEIGHT: 63 IN

## 2022-03-30 DIAGNOSIS — M48.061 SPINAL STENOSIS OF LUMBAR REGION, UNSPECIFIED WHETHER NEUROGENIC CLAUDICATION PRESENT: ICD-10-CM

## 2022-03-30 DIAGNOSIS — M54.16 LUMBAR RADICULOPATHY: Primary | ICD-10-CM

## 2022-03-30 DIAGNOSIS — I10 PRIMARY HYPERTENSION: ICD-10-CM

## 2022-03-30 PROCEDURE — 99213 OFFICE O/P EST LOW 20 MIN: CPT | Performed by: INTERNAL MEDICINE

## 2022-04-02 ENCOUNTER — TELEPHONE (OUTPATIENT)
Dept: INTERNAL MEDICINE CLINIC | Facility: CLINIC | Age: 87
End: 2022-04-02

## 2022-04-02 NOTE — TELEPHONE ENCOUNTER
Spoke to Javi, patient's daughter,  (on OCTAVIO, verified patient's name and ). Patient was seen on 3/30 and said she was prescribed Mirtazipine but nothing was sent to the pharmacy. Relayed Dr. Nataliya Hurd will be back in the office Monday or Tuesday. She verbalized understanding. Dr. Nataliya Hurd, please advise.

## 2022-04-12 RX ORDER — METOPROLOL SUCCINATE 100 MG/1
100 TABLET, EXTENDED RELEASE ORAL DAILY
Qty: 90 TABLET | Refills: 1 | Status: SHIPPED | OUTPATIENT
Start: 2022-04-12

## 2022-04-12 NOTE — TELEPHONE ENCOUNTER
Refill passed per [employer] protocol.   Requested Prescriptions   Pending Prescriptions Disp Refills    METOPROLOL SUCCINATE  MG Oral Tablet 24 Hr [Pharmacy Med Name: METOPROLOL ER SUCCINATE 100MG TABS] 90 tablet 3     Sig: TAKE 1 TABLET(100 MG) BY MOUTH DAILY        Hypertensive Medications Protocol Passed - 4/12/2022  5:58 AM        Passed - CMP or BMP in past 12 months        Passed - Appointment in past 6 or next 3 months        Passed - GFR Non- > 50     Lab Results   Component Value Date    GFRNAA 61 (L) 08/26/2021                     Recent Outpatient Visits              1 week ago Lumbar radiculopathy    [facility], [address] [place], MD    Office Visit    6 months ago Lumbar radiculopathy    [address],  Marry Castle [place], MD    Office Visit    7 months ago MGUS (monoclonal gammopathy of unknown significance)    [facility] Hematology Oncology Iban Modi MD    Office Visit    11 months ago Permanent atrial fibrillation [facility])    [employer], [address] [place], MD    Office Visit    11 months ago Persistent atrial fibrillation [facility])    [address], NP    Office Visit          Future Appointments         Provider Department Appt Notes    In 4 months Uvaldo Brannon 19 Hematology Oncology F/U

## 2022-05-13 RX ORDER — METOPROLOL SUCCINATE 100 MG/1
100 TABLET, EXTENDED RELEASE ORAL DAILY
Qty: 90 TABLET | Refills: 1 | OUTPATIENT
Start: 2022-05-13

## 2022-05-19 RX ORDER — METOPROLOL SUCCINATE 100 MG/1
100 TABLET, EXTENDED RELEASE ORAL DAILY
Qty: 90 TABLET | Refills: 1 | Status: SHIPPED | OUTPATIENT
Start: 2022-05-19 | End: 2023-01-09

## 2022-05-19 NOTE — TELEPHONE ENCOUNTER
Patient states she has one pill left at Metoprolol ER 100mg. She didn't know why her rx was denied for [facility]. Patient informed RX was sent 4/12/22 but was sent to [facility]. I can resend RX to [employer] order. Since patient is almost out of medication, she can  RX at [place] [address]. And then [facility] will be able to process the new RX when it is due. Patient will call [employer]; if any problems; patient can call us back.

## 2022-07-08 RX ORDER — MIRTAZAPINE 7.5 MG/1
TABLET, FILM COATED ORAL
Qty: 90 TABLET | Refills: 0 | Status: SHIPPED | OUTPATIENT
Start: 2022-07-08

## 2022-07-20 RX ORDER — ROSUVASTATIN CALCIUM 40 MG/1
40 TABLET, COATED ORAL NIGHTLY
Qty: 90 TABLET | Refills: 0 | Status: SHIPPED | OUTPATIENT
Start: 2022-07-20

## 2022-07-30 RX ORDER — LOSARTAN POTASSIUM 100 MG/1
TABLET ORAL
Qty: 90 TABLET | Refills: 1 | Status: SHIPPED | OUTPATIENT
Start: 2022-07-30

## 2022-07-30 RX ORDER — AMLODIPINE BESYLATE 5 MG/1
TABLET ORAL
Qty: 90 TABLET | Refills: 1 | Status: SHIPPED | OUTPATIENT
Start: 2022-07-30

## 2022-08-31 ENCOUNTER — TELEPHONE (OUTPATIENT)
Dept: HEMATOLOGY/ONCOLOGY | Facility: HOSPITAL | Age: 87
End: 2022-08-31

## 2022-08-31 ENCOUNTER — APPOINTMENT (OUTPATIENT)
Dept: HEMATOLOGY/ONCOLOGY | Facility: HOSPITAL | Age: 87
End: 2022-08-31
Attending: INTERNAL MEDICINE
Payer: MEDICARE

## 2022-09-04 ENCOUNTER — HOSPITAL ENCOUNTER (EMERGENCY)
Facility: HOSPITAL | Age: 87
Discharge: HOME | End: 2022-09-04
Attending: EMERGENCY MEDICINE
Payer: MEDICARE

## 2022-09-04 VITALS
WEIGHT: 117 LBS | SYSTOLIC BLOOD PRESSURE: 138 MMHG | TEMPERATURE: 98 F | HEART RATE: 68 BPM | HEIGHT: 63 IN | OXYGEN SATURATION: 98 % | RESPIRATION RATE: 18 BRPM | BODY MASS INDEX: 20.73 KG/M2 | DIASTOLIC BLOOD PRESSURE: 62 MMHG

## 2022-09-04 DIAGNOSIS — R58 BLEEDING: ICD-10-CM

## 2022-09-04 DIAGNOSIS — K08.89 LOOSENING OF TOOTH: Primary | ICD-10-CM

## 2022-09-04 PROCEDURE — 99282 EMERGENCY DEPT VISIT SF MDM: CPT

## 2022-09-04 RX ORDER — ESCITALOPRAM OXALATE 10 MG/1
10 TABLET ORAL DAILY
COMMUNITY

## 2022-09-04 NOTE — ED INITIAL ASSESSMENT (HPI)
80y F to ED via personal car with c/o bleeding from gums. Patient is on eliquis and woke this morning to blood in her mouth. Bleeding appears to originate from left bottom molars. Patient denies any trauma to mouth and has not brushed teeth this AM. Bleeding happened once before, about a month ago.

## 2022-09-06 ENCOUNTER — HOSPITAL ENCOUNTER (OUTPATIENT)
Age: 87
Discharge: HOME | End: 2022-09-06
Payer: MEDICARE

## 2022-09-06 ENCOUNTER — TELEPHONE (OUTPATIENT)
Dept: INTERNAL MEDICINE CLINIC | Facility: CLINIC | Age: 87
End: 2022-09-06

## 2022-09-06 ENCOUNTER — NURSE TRIAGE (OUTPATIENT)
Dept: INTERNAL MEDICINE CLINIC | Facility: CLINIC | Age: 87
End: 2022-09-06

## 2022-09-06 VITALS
DIASTOLIC BLOOD PRESSURE: 96 MMHG | HEART RATE: 107 BPM | BODY MASS INDEX: 20.37 KG/M2 | SYSTOLIC BLOOD PRESSURE: 164 MMHG | HEIGHT: 63 IN | OXYGEN SATURATION: 97 % | WEIGHT: 115 LBS | RESPIRATION RATE: 18 BRPM | TEMPERATURE: 99 F

## 2022-09-06 DIAGNOSIS — R05.1 ACUTE COUGH: Primary | ICD-10-CM

## 2022-09-06 DIAGNOSIS — U07.1 COVID: ICD-10-CM

## 2022-09-06 LAB
AMB EXT COVID-19 RESULT: DETECTED
SARS-COV-2 RNA RESP QL NAA+PROBE: DETECTED

## 2022-09-06 PROCEDURE — U0002 COVID-19 LAB TEST NON-CDC: HCPCS | Performed by: NURSE PRACTITIONER

## 2022-09-06 PROCEDURE — 99204 OFFICE O/P NEW MOD 45 MIN: CPT | Performed by: NURSE PRACTITIONER

## 2022-09-06 RX ORDER — BENZONATATE 100 MG/1
100 CAPSULE ORAL 3 TIMES DAILY PRN
Qty: 20 CAPSULE | Refills: 0 | Status: SHIPPED | OUTPATIENT
Start: 2022-09-06

## 2022-09-06 NOTE — TELEPHONE ENCOUNTER
Pt daughter calling and states IC provider is going to talk to Dr Eduardo Munroe in regards to pt covid positive status and treatment. Pt daughter also states IC provider told her Dr Eduardo Munroe will call pt too.     Per daughter best contact number is [phone]

## 2022-09-06 NOTE — TELEPHONE ENCOUNTER
Spoke to patient and daughter Gordon Burden, patient has symptoms since yesterday, very sore throat, phlegm in the throat, feels very tired, temperature resolved today. Patient had bleeding from the tooth 2 days ago has seen dentist, who advised to stop Eliquis, as of today no bleeding from the tooth. Patient was supposed to have tooth extraction but now she needs to wait until she recovers from. I advised daughter and patient that Paxlovid increases activity of the Eliquis, and dose needs to be decreased to avoid bleeding, patient already on 2.5 mg twice a day and I do not believe that dose can be decreased further. Patient has chronic atrial fibrillation and taking Eliquis for stroke prevention. We decided to wait and observe patient for COVID not to start Paxlovid immediately, but patient will restart Eliquis today to decrease risk of CVA. Patient and daughter understands plan. Asked daughter to report tomorrow and see how patient is doing, advised that many patients do well without Paxlovid. Advised to take patient to emergency room if she develops shortness of breath, increasing cough, confusion or fever. Advised to stay hydrated, eat small  meals soft food.

## 2022-09-06 NOTE — TELEPHONE ENCOUNTER
Patient  daughter calling  (  identified name and  )      She is returning    Call    Best call back number:  Ora Bi   [phone]

## 2022-09-07 NOTE — TELEPHONE ENCOUNTER
Spoke to daughter, patient is on background, main concern the patient still has soreness, phlegmon is not that bad, she slept well last night, cough suppressant helps to deal with the cough, I advised that we will not do anything, any virus can cause laryngitis which usually improves in 7 to 10 days. Asked them to report if patient starts feeling worse right now she seems improving.

## 2022-09-07 NOTE — TELEPHONE ENCOUNTER
Pt's daughter states that the pt is now back on the xarelto and has not had any recurrence of bleeding. Her throat pain has improved from yesterday, her temp is normal and she is drinking and eating (but not eating much). The benzonatate is helping with her cough. The only concern now is that she has a lot of phelgm in her throat which is making her voice gravelly and hoarse. She is not currently taking any OTC meds. Please advise on anything to help clear her throat.

## 2022-09-09 NOTE — TELEPHONE ENCOUNTER
Spoke to pt  and  daughter, patient states that she is feeling good, fatigued and APPETITE STILL PRESENT, IT IS ABOUT 3 DAYS SINCE DIAGNOSIS OF COVID. ADVISED TO PUSH FLUIDS CONSUME AT LEAST 50 OUNCES OF LIQUIDS A DAY, CHICKEN BROTH, EAT SALTINE CRACKERS, CHECK BLOOD PRESSURE THROUGHOUT THE DAY AND SEE HOW SHE DOES, TOMORROW BEFORE TAKING 3 BLOOD PRESSURE MEDICATIONS IN THE MORNING CHECK BLOOD PRESSURE FIRST COUPLE TIMES ADVISED THAT AT TIMES WILL NEED TO HOLD SOME OF THE BLOOD PRESSURE MEDICATIONS. ALSO PLACED ORDER FOR THE BLOOD TEST CBC AND CMP. ADVISED PATIENT TO CALL TOMORROW IF NOT SURE WHAT TO DO WITH BLOOD PRESSURE MEDICATIONS.

## 2022-09-09 NOTE — TELEPHONE ENCOUNTER
Received call from daughter re = condition update    Concerned for BP readin/47. Patient has restarted the low dose Eliquis 22 per Dr. Corrie Julio instructions, and daughter concerned if the BP is related to this medication    Patient does not have vomiting or diarrhea, she only had a cup of coffee this morning. She was sitting down when the BP was taken. Advised daughter to monitor for altered mental status/lethargy if she is continuing to get lower BP readings.   Also encouraged to push fluids, and try repeating BP at different times in the day

## 2022-09-10 NOTE — TELEPHONE ENCOUNTER
Spoke to patient, and daughter, and advised to restart medication half a dose amlodipine 5 mg daily and metoprolol succinate 50 mg now hold losartan, tomorrow morning her blood pressure coming up to 1/31/1940 start taking all medications as previously. Advised to call and speak to  On call if there is any questions about blood pressure levels and medications, advised patient to do blood test next week orders placed.

## 2022-09-10 NOTE — TELEPHONE ENCOUNTER
Patient and daughter Zuleima Starkey  calling ( identified name and  ) with update on b/p     Daughter reports b/p from this mornin/47, 103/59, 105/49, 110/48    Patient did not take ANY b/p meds today ,has eaten breakfast, drinking fluids     Denies any dizziness, no lightheadedness , no visio changes, no problem with body positional changes     Please advise and thank you.         Best call back number: Zuleima Starkey [phone]

## 2022-09-12 ENCOUNTER — TELEPHONE (OUTPATIENT)
Dept: INTERNAL MEDICINE CLINIC | Facility: CLINIC | Age: 87
End: 2022-09-12

## 2022-09-12 NOTE — TELEPHONE ENCOUNTER
pts daughter calling with bp measurements:   This /69  Afternoon 112/56 after nap  Drank some water and now 122/65    Will forward to pts pcp

## 2022-09-12 NOTE — TELEPHONE ENCOUNTER
Spoke to patient, she has been taking losartan 100 mg daily amlodipine 2.5 mg which is one half dose that she used to take, and metoprolol succinate ER taking 50 mg which is also equal 1/2 tablet.   I advised patient to continue same dosing for right now if blood pressure starts going higher report

## 2022-09-14 ENCOUNTER — APPOINTMENT (OUTPATIENT)
Dept: HEMATOLOGY/ONCOLOGY | Facility: HOSPITAL | Age: 87
End: 2022-09-14
Attending: INTERNAL MEDICINE
Payer: MEDICARE

## 2022-09-16 ENCOUNTER — LAB ENCOUNTER (OUTPATIENT)
Dept: LAB | Age: 87
End: 2022-09-16
Attending: INTERNAL MEDICINE

## 2022-09-16 DIAGNOSIS — I10 PRIMARY HYPERTENSION: ICD-10-CM

## 2022-09-16 DIAGNOSIS — R53.83 OTHER FATIGUE: ICD-10-CM

## 2022-09-16 LAB
ALBUMIN SERPL-MCNC: 2.8 G/DL (ref 3.4–5)
ALBUMIN/GLOB SERPL: 0.5 {RATIO} (ref 1–2)
ALP LIVER SERPL-CCNC: 89 U/L
ALT SERPL-CCNC: 27 U/L
ANION GAP SERPL CALC-SCNC: 1 MMOL/L (ref 0–18)
AST SERPL-CCNC: 29 U/L (ref 15–37)
BASOPHILS # BLD AUTO: 0.03 X10(3) UL (ref 0–0.2)
BASOPHILS NFR BLD AUTO: 0.4 %
BILIRUB SERPL-MCNC: 0.5 MG/DL (ref 0.1–2)
BUN BLD-MCNC: 15 MG/DL (ref 7–18)
BUN/CREAT SERPL: 15.6 (ref 10–20)
CALCIUM BLD-MCNC: 9 MG/DL (ref 8.5–10.1)
CHLORIDE SERPL-SCNC: 99 MMOL/L (ref 98–112)
CO2 SERPL-SCNC: 29 MMOL/L (ref 21–32)
CREAT BLD-MCNC: 0.96 MG/DL
DEPRECATED RDW RBC AUTO: 45.5 FL (ref 35.1–46.3)
EOSINOPHIL # BLD AUTO: 0.08 X10(3) UL (ref 0–0.7)
EOSINOPHIL NFR BLD AUTO: 1.2 %
ERYTHROCYTE [DISTWIDTH] IN BLOOD BY AUTOMATED COUNT: 13.2 % (ref 11–15)
FASTING STATUS PATIENT QL REPORTED: NO
GFR SERPLBLD BASED ON 1.73 SQ M-ARVRAT: 55 ML/MIN/1.73M2 (ref 60–?)
GLOBULIN PLAS-MCNC: 5.3 G/DL (ref 2.8–4.4)
GLUCOSE BLD-MCNC: 94 MG/DL (ref 70–99)
HCT VFR BLD AUTO: 33.7 %
HGB BLD-MCNC: 11.2 G/DL
IMM GRANULOCYTES # BLD AUTO: 0.02 X10(3) UL (ref 0–1)
IMM GRANULOCYTES NFR BLD: 0.3 %
LYMPHOCYTES # BLD AUTO: 2.73 X10(3) UL (ref 1–4)
LYMPHOCYTES NFR BLD AUTO: 40 %
MCH RBC QN AUTO: 31.5 PG (ref 26–34)
MCHC RBC AUTO-ENTMCNC: 33.2 G/DL (ref 31–37)
MCV RBC AUTO: 94.7 FL
MONOCYTES # BLD AUTO: 0.5 X10(3) UL (ref 0.1–1)
MONOCYTES NFR BLD AUTO: 7.3 %
NEUTROPHILS # BLD AUTO: 3.47 X10 (3) UL (ref 1.5–7.7)
NEUTROPHILS # BLD AUTO: 3.47 X10(3) UL (ref 1.5–7.7)
NEUTROPHILS NFR BLD AUTO: 50.8 %
OSMOLALITY SERPL CALC.SUM OF ELEC: 269 MOSM/KG (ref 275–295)
PLATELET # BLD AUTO: 257 10(3)UL (ref 150–450)
POTASSIUM SERPL-SCNC: 4 MMOL/L (ref 3.5–5.1)
PROT SERPL-MCNC: 8.1 G/DL (ref 6.4–8.2)
RBC # BLD AUTO: 3.56 X10(6)UL
SODIUM SERPL-SCNC: 129 MMOL/L (ref 136–145)
WBC # BLD AUTO: 6.8 X10(3) UL (ref 4–11)

## 2022-09-16 PROCEDURE — 85025 COMPLETE CBC W/AUTO DIFF WBC: CPT

## 2022-09-16 PROCEDURE — 36415 COLL VENOUS BLD VENIPUNCTURE: CPT

## 2022-09-16 PROCEDURE — 80053 COMPREHEN METABOLIC PANEL: CPT

## 2022-09-21 NOTE — TELEPHONE ENCOUNTER
Please review. Protocol failed / No protocol.   Requested Prescriptions   Pending Prescriptions Disp Refills    MIRTAZAPINE 7.5 MG Oral Tab [Pharmacy Med Name: MIRTAZAPINE 7.5 MG Tablet] 85 tablet 0     Sig: TAKE 1/2 TABLET NIGHTLY FOR 10 DAYS, THEN CONTINUE 1 TABLET NIGHTLY        There is no refill protocol information for this order          Recent Outpatient Visits              5 months ago Lumbar radiculopathy    [facility]    Office Visit    11 months ago Lumbar radiculopathy    [facility] Adi Schumacher [place], MD    Office Visit    1 year ago MGUS (monoclonal gammopathy of unknown significance)    [facility] AND [facility] Hematology Oncology Brandon Shepherd MD    Office Visit    1 year ago Permanent atrial fibrillation [facility]    [facility], [facility] Ad[facility] Javier [place], [place]    Office Visit    1 year ago Persistent atrial fibrillation [facility])    [address], NP    Office Visit

## 2022-09-22 RX ORDER — MIRTAZAPINE 7.5 MG/1
TABLET, FILM COATED ORAL
Qty: 90 TABLET | Refills: 1 | Status: SHIPPED | OUTPATIENT
Start: 2022-09-22

## 2022-09-28 ENCOUNTER — LAB ENCOUNTER (OUTPATIENT)
Dept: LAB | Age: 87
End: 2022-09-28
Attending: INTERNAL MEDICINE
Payer: MEDICARE

## 2022-09-28 DIAGNOSIS — E87.1 HYPONATREMIA: ICD-10-CM

## 2022-09-28 LAB
ANION GAP SERPL CALC-SCNC: 5 MMOL/L (ref 0–18)
BUN BLD-MCNC: 14 MG/DL (ref 7–18)
BUN/CREAT SERPL: 14.9 (ref 10–20)
CALCIUM BLD-MCNC: 8.5 MG/DL (ref 8.5–10.1)
CHLORIDE SERPL-SCNC: 95 MMOL/L (ref 98–112)
CO2 SERPL-SCNC: 28 MMOL/L (ref 21–32)
CREAT BLD-MCNC: 0.94 MG/DL
FASTING STATUS PATIENT QL REPORTED: NO
GFR SERPLBLD BASED ON 1.73 SQ M-ARVRAT: 57 ML/MIN/1.73M2 (ref 60–?)
GLUCOSE BLD-MCNC: 84 MG/DL (ref 70–99)
OSMOLALITY SERPL CALC.SUM OF ELEC: 266 MOSM/KG (ref 275–295)
POTASSIUM SERPL-SCNC: 4 MMOL/L (ref 3.5–5.1)
SODIUM SERPL-SCNC: 128 MMOL/L (ref 136–145)

## 2022-09-28 PROCEDURE — 80048 BASIC METABOLIC PNL TOTAL CA: CPT

## 2022-09-28 PROCEDURE — 36415 COLL VENOUS BLD VENIPUNCTURE: CPT

## 2022-10-03 ENCOUNTER — OFFICE VISIT (OUTPATIENT)
Dept: INTERNAL MEDICINE CLINIC | Facility: CLINIC | Age: 87
End: 2022-10-03
Payer: MEDICARE

## 2022-10-03 VITALS
HEART RATE: 73 BPM | SYSTOLIC BLOOD PRESSURE: 148 MMHG | WEIGHT: 119 LBS | OXYGEN SATURATION: 97 % | HEIGHT: 63 IN | BODY MASS INDEX: 21.09 KG/M2 | DIASTOLIC BLOOD PRESSURE: 76 MMHG

## 2022-10-03 DIAGNOSIS — E87.1 HYPONATREMIA: Primary | ICD-10-CM

## 2022-10-03 DIAGNOSIS — I10 PRIMARY HYPERTENSION: ICD-10-CM

## 2022-10-03 DIAGNOSIS — I48.21 PERMANENT ATRIAL FIBRILLATION (HCC): ICD-10-CM

## 2022-10-03 DIAGNOSIS — M48.062 SPINAL STENOSIS OF LUMBAR REGION WITH NEUROGENIC CLAUDICATION: ICD-10-CM

## 2022-10-03 PROCEDURE — 99214 OFFICE O/P EST MOD 30 MIN: CPT | Performed by: INTERNAL MEDICINE

## 2022-10-03 PROCEDURE — 1126F AMNT PAIN NOTED NONE PRSNT: CPT | Performed by: INTERNAL MEDICINE

## 2022-10-03 RX ORDER — FLUTICASONE PROPIONATE 50 MCG
2 SPRAY, SUSPENSION (ML) NASAL DAILY
Qty: 1 EACH | Refills: 2 | Status: SHIPPED | OUTPATIENT
Start: 2022-10-03

## 2022-10-03 RX ORDER — SOY PROTEIN
40 POWDER (GRAM) ORAL NIGHTLY
COMMUNITY
Start: 2021-09-27

## 2022-10-28 ENCOUNTER — LAB ENCOUNTER (OUTPATIENT)
Dept: LAB | Age: 87
End: 2022-10-28
Attending: INTERNAL MEDICINE
Payer: MEDICARE

## 2022-10-28 DIAGNOSIS — E87.1 HYPONATREMIA: ICD-10-CM

## 2022-10-28 LAB
ANION GAP SERPL CALC-SCNC: 5 MMOL/L (ref 0–18)
BUN BLD-MCNC: 22 MG/DL (ref 7–18)
BUN/CREAT SERPL: 22.2 (ref 10–20)
CALCIUM BLD-MCNC: 8.9 MG/DL (ref 8.5–10.1)
CHLORIDE SERPL-SCNC: 102 MMOL/L (ref 98–112)
CO2 SERPL-SCNC: 27 MMOL/L (ref 21–32)
CREAT BLD-MCNC: 0.99 MG/DL
FASTING STATUS PATIENT QL REPORTED: NO
GFR SERPLBLD BASED ON 1.73 SQ M-ARVRAT: 53 ML/MIN/1.73M2 (ref 60–?)
GLUCOSE BLD-MCNC: 79 MG/DL (ref 70–99)
OSMOLALITY SERPL CALC.SUM OF ELEC: 280 MOSM/KG (ref 275–295)
POTASSIUM SERPL-SCNC: 4 MMOL/L (ref 3.5–5.1)
SODIUM SERPL-SCNC: 134 MMOL/L (ref 136–145)
T4 FREE SERPL-MCNC: 0.9 NG/DL (ref 0.8–1.7)
TSI SER-ACNC: 4.71 MIU/ML (ref 0.36–3.74)

## 2022-10-28 PROCEDURE — 80048 BASIC METABOLIC PNL TOTAL CA: CPT

## 2022-10-28 PROCEDURE — 36415 COLL VENOUS BLD VENIPUNCTURE: CPT

## 2022-10-28 PROCEDURE — 84439 ASSAY OF FREE THYROXINE: CPT

## 2022-10-28 PROCEDURE — 84443 ASSAY THYROID STIM HORMONE: CPT

## 2022-12-14 RX ORDER — ROSUVASTATIN CALCIUM 40 MG/1
TABLET, COATED ORAL
Qty: 90 TABLET | Refills: 0 | Status: SHIPPED | OUTPATIENT
Start: 2022-12-14

## 2022-12-15 NOTE — TELEPHONE ENCOUNTER
Left VM requesting pt to have labs done prior to MD appointment on Monday, 8/23 at 1100. Detail Level: Zone

## 2023-01-09 RX ORDER — METOPROLOL SUCCINATE 100 MG/1
TABLET, EXTENDED RELEASE ORAL
Qty: 90 TABLET | Refills: 1 | Status: SHIPPED | OUTPATIENT
Start: 2023-01-09

## 2023-01-11 ENCOUNTER — MED REC SCAN ONLY (OUTPATIENT)
Dept: INTERNAL MEDICINE CLINIC | Facility: CLINIC | Age: 88
End: 2023-01-11

## 2023-01-11 ENCOUNTER — OFFICE VISIT (OUTPATIENT)
Dept: INTERNAL MEDICINE CLINIC | Facility: CLINIC | Age: 88
End: 2023-01-11

## 2023-01-11 VITALS
OXYGEN SATURATION: 92 % | HEIGHT: 63 IN | WEIGHT: 120 LBS | HEART RATE: 68 BPM | DIASTOLIC BLOOD PRESSURE: 78 MMHG | BODY MASS INDEX: 21.26 KG/M2 | SYSTOLIC BLOOD PRESSURE: 131 MMHG

## 2023-01-11 DIAGNOSIS — I10 PRIMARY HYPERTENSION: Primary | ICD-10-CM

## 2023-01-11 DIAGNOSIS — E03.8 OTHER SPECIFIED HYPOTHYROIDISM: ICD-10-CM

## 2023-01-11 DIAGNOSIS — M54.50 LUMBAR PAIN: ICD-10-CM

## 2023-02-24 ENCOUNTER — LAB ENCOUNTER (OUTPATIENT)
Dept: LAB | Age: 88
End: 2023-02-24
Attending: INTERNAL MEDICINE
Payer: MEDICARE

## 2023-02-24 DIAGNOSIS — I10 PRIMARY HYPERTENSION: ICD-10-CM

## 2023-02-24 DIAGNOSIS — E03.8 OTHER SPECIFIED HYPOTHYROIDISM: ICD-10-CM

## 2023-02-24 LAB
ALBUMIN SERPL-MCNC: 3.5 G/DL (ref 3.4–5)
ALBUMIN/GLOB SERPL: 0.6 {RATIO} (ref 1–2)
ALP LIVER SERPL-CCNC: 90 U/L
ALT SERPL-CCNC: 36 U/L
ANION GAP SERPL CALC-SCNC: 4 MMOL/L (ref 0–18)
AST SERPL-CCNC: 37 U/L (ref 15–37)
BASOPHILS # BLD AUTO: 0.05 X10(3) UL (ref 0–0.2)
BASOPHILS NFR BLD AUTO: 0.7 %
BILIRUB SERPL-MCNC: 0.4 MG/DL (ref 0.1–2)
BUN BLD-MCNC: 18 MG/DL (ref 7–18)
BUN/CREAT SERPL: 19.1 (ref 10–20)
CALCIUM BLD-MCNC: 9.6 MG/DL (ref 8.5–10.1)
CHLORIDE SERPL-SCNC: 103 MMOL/L (ref 98–112)
CO2 SERPL-SCNC: 28 MMOL/L (ref 21–32)
CREAT BLD-MCNC: 0.94 MG/DL
DEPRECATED RDW RBC AUTO: 50.2 FL (ref 35.1–46.3)
EOSINOPHIL # BLD AUTO: 0.06 X10(3) UL (ref 0–0.7)
EOSINOPHIL NFR BLD AUTO: 0.8 %
ERYTHROCYTE [DISTWIDTH] IN BLOOD BY AUTOMATED COUNT: 14.4 % (ref 11–15)
FASTING STATUS PATIENT QL REPORTED: YES
GFR SERPLBLD BASED ON 1.73 SQ M-ARVRAT: 56 ML/MIN/1.73M2 (ref 60–?)
GLOBULIN PLAS-MCNC: 5.7 G/DL (ref 2.8–4.4)
GLUCOSE BLD-MCNC: 92 MG/DL (ref 70–99)
HCT VFR BLD AUTO: 38.4 %
HGB BLD-MCNC: 12.6 G/DL
IMM GRANULOCYTES # BLD AUTO: 0.01 X10(3) UL (ref 0–1)
IMM GRANULOCYTES NFR BLD: 0.1 %
LYMPHOCYTES # BLD AUTO: 4.21 X10(3) UL (ref 1–4)
LYMPHOCYTES NFR BLD AUTO: 57.8 %
MCH RBC QN AUTO: 31.3 PG (ref 26–34)
MCHC RBC AUTO-ENTMCNC: 32.8 G/DL (ref 31–37)
MCV RBC AUTO: 95.3 FL
MONOCYTES # BLD AUTO: 0.46 X10(3) UL (ref 0.1–1)
MONOCYTES NFR BLD AUTO: 6.3 %
NEUTROPHILS # BLD AUTO: 2.5 X10 (3) UL (ref 1.5–7.7)
NEUTROPHILS # BLD AUTO: 2.5 X10(3) UL (ref 1.5–7.7)
NEUTROPHILS NFR BLD AUTO: 34.3 %
OSMOLALITY SERPL CALC.SUM OF ELEC: 282 MOSM/KG (ref 275–295)
PLATELET # BLD AUTO: 228 10(3)UL (ref 150–450)
POTASSIUM SERPL-SCNC: 4.3 MMOL/L (ref 3.5–5.1)
PROT SERPL-MCNC: 9.2 G/DL (ref 6.4–8.2)
RBC # BLD AUTO: 4.03 X10(6)UL
SODIUM SERPL-SCNC: 135 MMOL/L (ref 136–145)
T4 FREE SERPL-MCNC: 0.9 NG/DL (ref 0.8–1.7)
TSI SER-ACNC: 4.28 MIU/ML (ref 0.36–3.74)
WBC # BLD AUTO: 7.3 X10(3) UL (ref 4–11)

## 2023-02-24 PROCEDURE — 84443 ASSAY THYROID STIM HORMONE: CPT

## 2023-02-24 PROCEDURE — 85025 COMPLETE CBC W/AUTO DIFF WBC: CPT

## 2023-02-24 PROCEDURE — 84439 ASSAY OF FREE THYROXINE: CPT

## 2023-02-24 PROCEDURE — 80053 COMPREHEN METABOLIC PANEL: CPT

## 2023-02-24 PROCEDURE — 36415 COLL VENOUS BLD VENIPUNCTURE: CPT

## 2023-03-01 ENCOUNTER — TELEPHONE (OUTPATIENT)
Dept: INTERNAL MEDICINE CLINIC | Facility: CLINIC | Age: 88
End: 2023-03-01

## 2023-03-01 NOTE — TELEPHONE ENCOUNTER
Spoke to daughter, advised to discontinue gabapentin because its not helpful, regarding CBD needs advised that we do not have a lot of experience advising this medications, patient is 80years old reaction could be unpredictable, patient has to decide to see if i she wants to try the smallest possible amount criteria increase and see if she is tolerates and it is helpful.

## 2023-03-01 NOTE — TELEPHONE ENCOUNTER
Spoke to patient's daughter (name and  of patient verified). She reports gabapentin is not helping with patient's back pain when taking 100mg once or twice daily. Administration instructions reviewed. Advised directions state to take 100 mg three times daily. Dr. Ferrer Shoulders recommendations reviewed and phone numbers for Dr. Shreya Gant and Physical Therapy provided. Daughter states patient is not interested in injections and she has been doing core exercises and stationary bike at home. She would like to know if it is ok for patient to try CBD gummies, she was concerned about effects on blood pressure. Per progress note by Dr. Kristine Hui   Lumbar pain chronic, advised patient to see physiatry and consider physical therapy, gabapentin because its not effective and she cannot tolerate higher doses    Physical Therapy Referral:  Referral Type: PHYSICAL THERAPY - INTERNAL Dx: Lumbar radiculopathy (M54.16)  Spinal stenosis of lumbar region, unspecified whether neurogenic claudication present (M48.061)   To schedule Physical Therapy at any of the [facility] facilities, please call [phone]. Physiatry Referral:  Provider Address Phone   Augusto Desir MD [facility] [address]  [address] [phone]         Dr. Kristine Hui, please advise if ok for patient to try CBD gummies for back pain. Thank you.

## 2023-03-09 RX ORDER — AMLODIPINE BESYLATE 5 MG/1
5 TABLET ORAL DAILY
Qty: 90 TABLET | Refills: 3 | Status: SHIPPED | OUTPATIENT
Start: 2023-03-09

## 2023-03-09 RX ORDER — LOSARTAN POTASSIUM 100 MG/1
100 TABLET ORAL DAILY
Qty: 90 TABLET | Refills: 3 | Status: SHIPPED | OUTPATIENT
Start: 2023-03-09

## 2023-03-09 NOTE — TELEPHONE ENCOUNTER
Refill passed per [facility] protocol    Requested Prescriptions   Pending Prescriptions Disp Refills    AMLODIPINE 5 MG Oral Tab [Pharmacy Med Name: AMLODIPINE BESYLATE 5 MG Tablet] 90 tablet 1     Sig: TAKE 1 TABLET EVERY DAY       Hypertensive Medications Protocol Passed - 3/9/2023  2:46 AM        Passed - In person appointment in the past 12 or next 3 months     Recent Outpatient Visits              1 month ago Primary hypertension    [address],[facility], [place], Krista Diana MD    Office Visit    5 months ago Hyponatremia    [address], [place], Adriana Kirk MD    Office Visit    11 months ago Lumbar radiculopathy    [address],[facility], [facility], Adriana Kirk MD    Office Visit    1 year ago Lumbar radiculopathy    [address],[facility], [facility], Adriana Kirk MD    Office Visit    1 year ago MGUS (monoclonal gammopathy of unknown significance)    [facility] AND [facility] Hematology Oncology Darell Alves MD    Office Visit                      Passed - Last BP reading less than 140/90     BP Readings from Last 1 Encounters:  01/11/23 : 131/78              Passed - CMP or BMP in past 6 months     Recent Results (from the past 4392 hour(s))   COMP METABOLIC PANEL (14)    Collection Time: 02/24/23  7:34 AM   Result Value Ref Range    Glucose 92 70 - 99 mg/dL    Sodium 135 (L) 136 - 145 mmol/L    Potassium 4.3 3.5 - 5.1 mmol/L    Chloride 103 98 - 112 mmol/L    CO2 28.0 21.0 - 32.0 mmol/L    Anion Gap 4 0 - 18 mmol/L    BUN 18 7 - 18 mg/dL    Creatinine 0.94 0.55 - 1.02 mg/dL    BUN/CREA Ratio 19.1 10.0 - 20.0    Calcium, Total 9.6 8.5 - 10.1 mg/dL    Calculated Osmolality 282 275 - 295 mOsm/kg    eGFR-Cr 56 (L) >=60 mL/min/1.73m2    ALT 36 13 - 56 U/L    AST 37 15 - 37 U/L    Alkaline Phosphatase 90 55 - 142 U/L    Bilirubin, Total 0.4 0.1 - 2.0 mg/dL    Total Protein 9.2 (H) 6.4 - 8.2 g/dL    Albumin 3.5 3.4 - 5.0 g/dL    Globulin  5.7 (H) 2.8 - 4.4 g/dL    A/G Ratio 0.6 (L) 1.0 - 2.0    Patient Fasting for CMP? Yes      *Note: Due to a large number of results and/or encounters for the requested time period, some results have not been displayed. A complete set of results can be found in Results Review.                Passed - In person appointment or virtual visit in the past 6 months     Recent Outpatient Visits              1 month ago Primary hypertension    Ramon Howard Juris Plater, [place], MD    Office Visit    5 months ago Hyponatremia    Ramon Howard Juris Plater, [place], [place]    Office Visit    11 months ago Lumbar radiculopathy    Frank Groves MD    Office Visit    1 year ago Lumbar radiculopathy    Dorise Milliner, [address], [place] Saunas, MD    Office Visit    1 year ago MGUS (monoclonal gammopathy of unknown significance)    [facility] Cheryl Oglesby MD    Office Visit                      Passed [facility] or [facility] > 50     GFR Evaluation  EGFRCR: 56 , resulted on 2/24/2023            LOSARTAN 100 MG Oral Tab [Pharmacy Med Name: Emiliano Lakhani POTASSIUM 100 MG Tablet] 90 tablet 1     Sig: TAKE 1 TABLET EVERY DAY       Hypertensive Medications Protocol Passed - 3/9/2023  2:46 AM        Passed - In person appointment in the past 12 or next 3 months     Recent Outpatient Visits              1 month ago Primary hypertension    Ramon Howard Guido Lone, MD    Office Visit    5 months ago Hyponatremia    Frank Groves MD    Office Visit    11 months ago Lumbar radiculopathy    Frank Groves MD    Office Visit    1 year ago Lumbar radiculopathy    Stephanie Groves [place], MD Office Visit    1 year ago MGUS (monoclonal gammopathy of unknown significance)    [facility] Hematology Oncology Dave Escobar MD    Office Visit                      Passed - Last BP reading less than 140/90     BP Readings from Last 1 Encounters:  01/11/23 : 131/78              Passed - CMP or BMP in past 6 months     Recent Results (from the past 4392 hour(s))   COMP METABOLIC PANEL (14)    Collection Time: 02/24/23  7:34 AM   Result Value Ref Range    Glucose 92 70 - 99 mg/dL    Sodium 135 (L) 136 - 145 mmol/L    Potassium 4.3 3.5 - 5.1 mmol/L    Chloride 103 98 - 112 mmol/L    CO2 28.0 21.0 - 32.0 mmol/L    Anion Gap 4 0 - 18 mmol/L    BUN 18 7 - 18 mg/dL    Creatinine 0.94 0.55 - 1.02 mg/dL    BUN/CREA Ratio 19.1 10.0 - 20.0    Calcium, Total 9.6 8.5 - 10.1 mg/dL    Calculated Osmolality 282 275 - 295 mOsm/kg    eGFR-Cr 56 (L) >=60 mL/min/1.73m2    ALT 36 13 - 56 U/L    AST 37 15 - 37 U/L    Alkaline Phosphatase 90 55 - 142 U/L    Bilirubin, Total 0.4 0.1 - 2.0 mg/dL    Total Protein 9.2 (H) 6.4 - 8.2 g/dL    Albumin 3.5 3.4 - 5.0 g/dL    Globulin  5.7 (H) 2.8 - 4.4 g/dL    A/G Ratio 0.6 (L) 1.0 - 2.0    Patient Fasting for CMP? Yes      *Note: Due to a large number of results and/or encounters for the requested time period, some results have not been displayed. A complete set of results can be found in Results Review.                Passed - In person appointment or virtual visit in the past 6 months     Recent Outpatient Visits              1 month ago Primary hypertension    [facility] [place]    Office Visit    5 months ago Hyponatremia    [facility]    Office Visit    11 months ago Lumbar radiculopathy    Ibrahima ReasonerVince MD    Office Visit    1 year ago Lumbar radiculopathy    [facility], Main [address], Juliane Sheridan, Belinda Chaparro MD    Office Visit    1 year ago MGUS (monoclonal gammopathy of unknown significance)    [facility] Isela Benitez MD    Office Visit                      Passed [facility] or [facility] > 50     GFR Evaluation  EGFRCR: 56 , resulted on 2/24/2023

## 2023-05-16 ENCOUNTER — LAB ENCOUNTER (OUTPATIENT)
Dept: LAB | Age: 88
End: 2023-05-16
Attending: INTERNAL MEDICINE
Payer: MEDICARE

## 2023-05-16 DIAGNOSIS — E78.00 PURE HYPERCHOLESTEROLEMIA: ICD-10-CM

## 2023-05-16 DIAGNOSIS — I10 ESSENTIAL HYPERTENSION, MALIGNANT: Primary | ICD-10-CM

## 2023-05-16 LAB
ALBUMIN SERPL-MCNC: 3.6 G/DL (ref 3.4–5)
ALBUMIN/GLOB SERPL: 0.7 {RATIO} (ref 1–2)
ALP LIVER SERPL-CCNC: 81 U/L
ALT SERPL-CCNC: 32 U/L
ANION GAP SERPL CALC-SCNC: 6 MMOL/L (ref 0–18)
AST SERPL-CCNC: 37 U/L (ref 15–37)
BASOPHILS # BLD AUTO: 0.04 X10(3) UL (ref 0–0.2)
BASOPHILS NFR BLD AUTO: 0.6 %
BILIRUB SERPL-MCNC: 0.6 MG/DL (ref 0.1–2)
BUN BLD-MCNC: 18 MG/DL (ref 7–18)
BUN/CREAT SERPL: 19.8 (ref 10–20)
CALCIUM BLD-MCNC: 9.7 MG/DL (ref 8.5–10.1)
CHLORIDE SERPL-SCNC: 100 MMOL/L (ref 98–112)
CHOLEST SERPL-MCNC: 137 MG/DL (ref ?–200)
CO2 SERPL-SCNC: 26 MMOL/L (ref 21–32)
CREAT BLD-MCNC: 0.91 MG/DL
DEPRECATED RDW RBC AUTO: 50.3 FL (ref 35.1–46.3)
EOSINOPHIL # BLD AUTO: 0.04 X10(3) UL (ref 0–0.7)
EOSINOPHIL NFR BLD AUTO: 0.6 %
ERYTHROCYTE [DISTWIDTH] IN BLOOD BY AUTOMATED COUNT: 14.4 % (ref 11–15)
FASTING PATIENT LIPID ANSWER: YES
FASTING STATUS PATIENT QL REPORTED: YES
GFR SERPLBLD BASED ON 1.73 SQ M-ARVRAT: 58 ML/MIN/1.73M2 (ref 60–?)
GLOBULIN PLAS-MCNC: 5.4 G/DL (ref 2.8–4.4)
GLUCOSE BLD-MCNC: 100 MG/DL (ref 70–99)
HCT VFR BLD AUTO: 38.9 %
HDLC SERPL-MCNC: 76 MG/DL (ref 40–59)
HGB BLD-MCNC: 12.7 G/DL
IMM GRANULOCYTES # BLD AUTO: 0.01 X10(3) UL (ref 0–1)
IMM GRANULOCYTES NFR BLD: 0.2 %
LDLC SERPL CALC-MCNC: 46 MG/DL (ref ?–100)
LYMPHOCYTES # BLD AUTO: 3.17 X10(3) UL (ref 1–4)
LYMPHOCYTES NFR BLD AUTO: 50.9 %
MCH RBC QN AUTO: 31.1 PG (ref 26–34)
MCHC RBC AUTO-ENTMCNC: 32.6 G/DL (ref 31–37)
MCV RBC AUTO: 95.1 FL
MONOCYTES # BLD AUTO: 0.44 X10(3) UL (ref 0.1–1)
MONOCYTES NFR BLD AUTO: 7.1 %
NEUTROPHILS # BLD AUTO: 2.53 X10 (3) UL (ref 1.5–7.7)
NEUTROPHILS # BLD AUTO: 2.53 X10(3) UL (ref 1.5–7.7)
NEUTROPHILS NFR BLD AUTO: 40.6 %
NONHDLC SERPL-MCNC: 61 MG/DL (ref ?–130)
OSMOLALITY SERPL CALC.SUM OF ELEC: 276 MOSM/KG (ref 275–295)
PLATELET # BLD AUTO: 209 10(3)UL (ref 150–450)
POTASSIUM SERPL-SCNC: 4.2 MMOL/L (ref 3.5–5.1)
PROT SERPL-MCNC: 9 G/DL (ref 6.4–8.2)
RBC # BLD AUTO: 4.09 X10(6)UL
SODIUM SERPL-SCNC: 132 MMOL/L (ref 136–145)
TRIGL SERPL-MCNC: 77 MG/DL (ref 30–149)
VLDLC SERPL CALC-MCNC: 11 MG/DL (ref 0–30)
WBC # BLD AUTO: 6.2 X10(3) UL (ref 4–11)

## 2023-05-16 PROCEDURE — 36415 COLL VENOUS BLD VENIPUNCTURE: CPT

## 2023-05-16 PROCEDURE — 80061 LIPID PANEL: CPT

## 2023-05-16 PROCEDURE — 85025 COMPLETE CBC W/AUTO DIFF WBC: CPT

## 2023-05-16 PROCEDURE — 80053 COMPREHEN METABOLIC PANEL: CPT

## 2023-06-08 ENCOUNTER — NURSE TRIAGE (OUTPATIENT)
Dept: INTERNAL MEDICINE CLINIC | Facility: CLINIC | Age: 88
End: 2023-06-08

## 2023-06-09 ENCOUNTER — OFFICE VISIT (OUTPATIENT)
Dept: INTERNAL MEDICINE CLINIC | Facility: CLINIC | Age: 88
End: 2023-06-09

## 2023-06-09 ENCOUNTER — TELEPHONE (OUTPATIENT)
Dept: RHEUMATOLOGY | Facility: CLINIC | Age: 88
End: 2023-06-09

## 2023-06-09 VITALS
HEART RATE: 84 BPM | DIASTOLIC BLOOD PRESSURE: 73 MMHG | HEIGHT: 63 IN | BODY MASS INDEX: 20.68 KG/M2 | RESPIRATION RATE: 16 BRPM | WEIGHT: 116.69 LBS | SYSTOLIC BLOOD PRESSURE: 131 MMHG

## 2023-06-09 DIAGNOSIS — R26.81 UNSTEADY GAIT: ICD-10-CM

## 2023-06-09 DIAGNOSIS — R21 RASH AND NONSPECIFIC SKIN ERUPTION: Primary | ICD-10-CM

## 2023-06-09 DIAGNOSIS — M47.26 OSTEOARTHRITIS OF SPINE WITH RADICULOPATHY, LUMBAR REGION: ICD-10-CM

## 2023-06-09 DIAGNOSIS — D47.2 MONOCLONAL GAMMOPATHIES: ICD-10-CM

## 2023-06-09 PROCEDURE — 99213 OFFICE O/P EST LOW 20 MIN: CPT | Performed by: INTERNAL MEDICINE

## 2023-06-09 PROCEDURE — 1126F AMNT PAIN NOTED NONE PRSNT: CPT | Performed by: INTERNAL MEDICINE

## 2023-06-09 RX ORDER — MOMETASONE FUROATE 1 MG/G
OINTMENT TOPICAL
Qty: 45 G | Refills: 0 | Status: SHIPPED | OUTPATIENT
Start: 2023-06-09

## 2023-06-09 RX ORDER — DESOXIMETASONE 2.5 MG/G
OINTMENT TOPICAL
Qty: 60 G | Refills: 0 | Status: SHIPPED | OUTPATIENT
Start: 2023-06-09

## 2023-06-09 NOTE — TELEPHONE ENCOUNTER
Patient's daughter would like to know which Tylenol PM she should give patient. Please advise. 1. Tylenol PM Simply Sleep   2. Extra Strength Tylenol PM.     Tylenol PM Simply Sleep has Diphenhydramine HCl 25 mg. Extra Strength Tylenol PM has Diphenhydramine Hcl 25 mg + Acetaminophen 500 mg.

## 2023-06-09 NOTE — TELEPHONE ENCOUNTER
Desoximetasone (TOPICORT) 0.25 % External Ointment, appy  thin layer to lesion on the leg for 2-3 weeksa, Disp: 60 g, Rfl: 0    Pharmacy note: Drug not covered by patient plan. Please call/ fa the pharmacy to change medication along with strength, directions, quantity, and refills.

## 2023-06-09 NOTE — TELEPHONE ENCOUNTER
Pharmacy calling. Confirmed patient's name and . Alternatives for Desoximetasone (TOPICORT) 0.25 % External Ointment are triamincinolone or mometasone. Please advise.

## 2023-06-09 NOTE — TELEPHONE ENCOUNTER
Please advise patient and her daughter that she can try over-the-counter Tylenol PM for insomnia.   See below

## 2023-06-16 RX ORDER — FLUTICASONE PROPIONATE 50 MCG
2 SPRAY, SUSPENSION (ML) NASAL DAILY
Qty: 16 G | Refills: 3 | Status: SHIPPED | OUTPATIENT
Start: 2023-06-16

## 2023-06-16 NOTE — TELEPHONE ENCOUNTER
Refill passed per [facility], [facility] protocol. Requested Prescriptions   Pending Prescriptions Disp Refills    fluticasone propionate 50 MCG/ACT Nasal Suspension 1 each 2     Si sprays by Each Nare route daily.        Allergy Medication Protocol Passed - 2023 12:08 PM        Passed - In person appointment or virtual visit in the past 12 mos or appointment in next 3 mos     Recent Outpatient Visits              1 week ago Rash and nonspecific skin eruption    [facility], Stephanie Shah MD    Office Visit    5 months ago Primary hypertension    Lacretia Dicker, [address], [place] Blondie Childes, MD    Office Visit    8 months ago Hyponatremia    Stephanie Barnes MD    Office Visit    1 year ago Lumbar radiculopathy    Stephanie Barnes MD    Office Visit    1 year ago Lumbar radiculopathy    Stephanie Barnes MD    Office Visit

## 2023-07-13 RX ORDER — METOPROLOL SUCCINATE 100 MG/1
100 TABLET, EXTENDED RELEASE ORAL DAILY
Qty: 90 TABLET | Refills: 3 | Status: SHIPPED | OUTPATIENT
Start: 2023-07-13

## 2023-07-13 NOTE — TELEPHONE ENCOUNTER
Refill passed per [facility] protocol.    Requested Prescriptions   Pending Prescriptions Disp Refills    METOPROLOL SUCCINATE  MG Oral Tablet 24 Hr [Pharmacy Med Name: METOPROLOL ER SUCCINATE 100MG TABS] 90 tablet 1     Sig: TAKE 1 TABLET(100 MG) BY MOUTH DAILY       Hypertensive Medications Protocol Passed - 7/13/2023  5:51 AM        Passed - In person appointment in the past 12 or next 3 months     Recent Outpatient Visits              1 month ago Rash and nonspecific skin eruption    [address] [place] [address], Alfred Aleman MD    Office Visit    6 months ago Primary hypertension    [address] [place] [address] Alfred Aleman MD    Office Visit    9 months ago Hyponatremia    [address] [place] [address], Alfred Aleman MD    Office Visit    1 year ago Lumbar radiculopathy    [address] [place] [address] Krista Ny MD    Office Visit    1 year ago Lumbar radiculopathy    [facility], Krista Ny MD    Office Visit                      Passed - Last BP reading less than 140/90     BP Readings from Last 1 Encounters:  06/09/23 : 131/73              Passed - CMP or BMP in past 6 months     Recent Results (from the past 4392 hour(s))   COMP METABOLIC PANEL (14)    Collection Time: 05/16/23 10:02 AM   Result Value Ref Range    Glucose 100 (H) 70 - 99 mg/dL    Sodium 132 (L) 136 - 145 mmol/L    Potassium 4.2 3.5 - 5.1 mmol/L    Chloride 100 98 - 112 mmol/L    CO2 26.0 21.0 - 32.0 mmol/L    Anion Gap 6 0 - 18 mmol/L    BUN 18 7 - 18 mg/dL    Creatinine 0.91 0.55 - 1.02 mg/dL    BUN/CREA Ratio 19.8 10.0 - 20.0    Calcium, Total 9.7 8.5 - 10.1 mg/dL    Calculated Osmolality 276 275 - 295 mOsm/kg    eGFR-Cr 58 (L) >=60 mL/min/1.73m2    ALT 32 13 - 56 U/L    AST 37 15 - 37 U/L    Alkaline Phosphatase 81 55 - 142 U/L    Bilirubin, Total 0.6 0.1 - 2.0 mg/dL    Total Protein 9.0 (H) 6.4 - 8.2 g/dL    Albumin 3.6 3.4 - 5.0 g/dL    Globulin  5.4 (H) 2.8 - 4.4 g/dL    A/G Ratio 0.7 (L) 1.0 - 2.0    Patient Fasting for CMP? Yes      *Note: Due to a large number of results and/or encounters for the requested time period, some results have not been displayed. A complete set of results can be found in Results Review.                Passed - In person appointment or virtual visit in the past 6 months     Recent Outpatient Visits              1 month ago Rash and nonspecific skin eruption    Ramon Sawyer, Krista Arteaga MD    Office Visit    6 months ago Primary hypertension    [address], Krista Arteaga MD    Office Visit    9 months ago Hyponatremia    [address], Delfin Pelletier MD    Office Visit    1 year ago Lumbar radiculopathy    [address], Delfin Pelletier MD    Office Visit    1 year ago Lumbar radiculopathy    [address], Delfin Pelletier MD    Office Visit                      Passed - EGFRCR or GFRNAA > 50     GFR Evaluation  EGFRCR: 58 , resulted on 5/16/2023               Recent Outpatient Visits              1 month ago Rash and nonspecific skin eruption    [address], [place] Anto[place] Funk, MD    Office Visit    6 months ago Primary hypertension    [address], Delfin Pelletier MD    Office Visit    9 months ago Hyponatremia    [address], Delfin Pelletier MD    Office Visit    1 year ago Lumbar radiculopathy    [address], Delfin Pelletier MD    Office Visit    1 year ago Lumbar radiculopathy    [address], Krista Arteaga MD    Office Visit

## 2023-08-14 ENCOUNTER — TELEPHONE (OUTPATIENT)
Dept: INTERNAL MEDICINE CLINIC | Facility: CLINIC | Age: 88
End: 2023-08-14

## 2023-08-14 NOTE — TELEPHONE ENCOUNTER
Per daughter Francisca Arellano patient took Benadryl last night for allergy symptoms/runny nose. She would like to know if there is any contraindication to taking benadryl with her other medication. Please advise if patient should refrain from taking benadryl moving forward or if okay. Per interaction :   MONITOR CLOSELY  Significant interaction possible (monitoring by your doctor required). Diphenhydramine + Metoprolol  Diphenhydramine will increase the level or effect of Metoprolol by altering drug metabolism. Diphenhydramine + Mirtazapine  Diphenhydramine and Mirtazapine both increase sedation and drowsiness. Gabapentin + Diphenhydramine  Gabapentin , Diphenhydramine . Either increases the level of the other by added drug effects. N/A

## 2023-10-03 ENCOUNTER — OFFICE VISIT (OUTPATIENT)
Dept: INTERNAL MEDICINE CLINIC | Facility: CLINIC | Age: 88
End: 2023-10-03

## 2023-10-03 VITALS
RESPIRATION RATE: 16 BRPM | WEIGHT: 116.81 LBS | HEART RATE: 54 BPM | SYSTOLIC BLOOD PRESSURE: 140 MMHG | HEIGHT: 63 IN | BODY MASS INDEX: 20.7 KG/M2 | DIASTOLIC BLOOD PRESSURE: 60 MMHG

## 2023-10-03 DIAGNOSIS — I48.0 PAF (PAROXYSMAL ATRIAL FIBRILLATION) (HCC): ICD-10-CM

## 2023-10-03 DIAGNOSIS — I10 PRIMARY HYPERTENSION: ICD-10-CM

## 2023-10-03 DIAGNOSIS — R53.83 OTHER FATIGUE: Primary | ICD-10-CM

## 2023-10-03 PROCEDURE — 99214 OFFICE O/P EST MOD 30 MIN: CPT | Performed by: INTERNAL MEDICINE

## 2023-10-03 PROCEDURE — 1126F AMNT PAIN NOTED NONE PRSNT: CPT | Performed by: INTERNAL MEDICINE

## 2023-10-05 ENCOUNTER — LAB ENCOUNTER (OUTPATIENT)
Dept: LAB | Age: 88
End: 2023-10-05
Attending: INTERNAL MEDICINE
Payer: MEDICARE

## 2023-10-05 DIAGNOSIS — D64.89 ANEMIA DUE TO OTHER CAUSE, NOT CLASSIFIED: ICD-10-CM

## 2023-10-05 DIAGNOSIS — R53.83 OTHER FATIGUE: ICD-10-CM

## 2023-10-05 LAB
ALBUMIN SERPL-MCNC: 3 G/DL (ref 3.4–5)
ALBUMIN/GLOB SERPL: 0.5 {RATIO} (ref 1–2)
ALP LIVER SERPL-CCNC: 88 U/L
ALT SERPL-CCNC: 36 U/L
ANION GAP SERPL CALC-SCNC: 4 MMOL/L (ref 0–18)
AST SERPL-CCNC: 35 U/L (ref 15–37)
BASOPHILS # BLD AUTO: 0.04 X10(3) UL (ref 0–0.2)
BASOPHILS NFR BLD AUTO: 0.5 %
BILIRUB SERPL-MCNC: 0.4 MG/DL (ref 0.1–2)
BUN BLD-MCNC: 20 MG/DL (ref 7–18)
CALCIUM BLD-MCNC: 9.1 MG/DL (ref 8.5–10.1)
CHLORIDE SERPL-SCNC: 100 MMOL/L (ref 98–112)
CO2 SERPL-SCNC: 28 MMOL/L (ref 21–32)
CREAT BLD-MCNC: 0.88 MG/DL
EGFRCR SERPLBLD CKD-EPI 2021: 61 ML/MIN/1.73M2 (ref 60–?)
EOSINOPHIL # BLD AUTO: 0.02 X10(3) UL (ref 0–0.7)
EOSINOPHIL NFR BLD AUTO: 0.3 %
ERYTHROCYTE [DISTWIDTH] IN BLOOD BY AUTOMATED COUNT: 14.5 %
FASTING STATUS PATIENT QL REPORTED: NO
GLOBULIN PLAS-MCNC: 5.5 G/DL (ref 2.8–4.4)
GLUCOSE BLD-MCNC: 126 MG/DL (ref 70–99)
HCT VFR BLD AUTO: 35.5 %
HGB BLD-MCNC: 11.5 G/DL
IMM GRANULOCYTES # BLD AUTO: 0.02 X10(3) UL (ref 0–1)
IMM GRANULOCYTES NFR BLD: 0.3 %
LYMPHOCYTES # BLD AUTO: 3.96 X10(3) UL (ref 1–4)
LYMPHOCYTES NFR BLD AUTO: 52.9 %
MCH RBC QN AUTO: 30.8 PG (ref 26–34)
MCHC RBC AUTO-ENTMCNC: 32.4 G/DL (ref 31–37)
MCV RBC AUTO: 95.2 FL
MONOCYTES # BLD AUTO: 0.39 X10(3) UL (ref 0.1–1)
MONOCYTES NFR BLD AUTO: 5.2 %
NEUTROPHILS # BLD AUTO: 3.06 X10 (3) UL (ref 1.5–7.7)
NEUTROPHILS # BLD AUTO: 3.06 X10(3) UL (ref 1.5–7.7)
NEUTROPHILS NFR BLD AUTO: 40.8 %
OSMOLALITY SERPL CALC.SUM OF ELEC: 278 MOSM/KG (ref 275–295)
PLATELET # BLD AUTO: 203 10(3)UL (ref 150–450)
POTASSIUM SERPL-SCNC: 3.8 MMOL/L (ref 3.5–5.1)
PROT SERPL-MCNC: 8.5 G/DL (ref 6.4–8.2)
RBC # BLD AUTO: 3.73 X10(6)UL
SODIUM SERPL-SCNC: 132 MMOL/L (ref 136–145)
TSI SER-ACNC: 3.12 MIU/ML (ref 0.36–3.74)
WBC # BLD AUTO: 7.5 X10(3) UL (ref 4–11)

## 2023-10-05 PROCEDURE — 80053 COMPREHEN METABOLIC PANEL: CPT

## 2023-10-05 PROCEDURE — 84443 ASSAY THYROID STIM HORMONE: CPT

## 2023-10-05 PROCEDURE — 82728 ASSAY OF FERRITIN: CPT

## 2023-10-05 PROCEDURE — 83540 ASSAY OF IRON: CPT

## 2023-10-05 PROCEDURE — 85025 COMPLETE CBC W/AUTO DIFF WBC: CPT

## 2023-10-05 PROCEDURE — 83550 IRON BINDING TEST: CPT

## 2023-10-05 PROCEDURE — 36415 COLL VENOUS BLD VENIPUNCTURE: CPT

## 2023-10-07 LAB
DEPRECATED HBV CORE AB SER IA-ACNC: 247.6 NG/ML
IRON SATN MFR SERPL: 23 %
IRON SERPL-MCNC: 83 UG/DL
TIBC SERPL-MCNC: 361 UG/DL (ref 240–450)
TRANSFERRIN SERPL-MCNC: 242 MG/DL (ref 200–360)

## 2023-10-17 ENCOUNTER — OFFICE VISIT (OUTPATIENT)
Dept: INTERNAL MEDICINE CLINIC | Facility: CLINIC | Age: 88
End: 2023-10-17

## 2023-10-17 ENCOUNTER — NURSE TRIAGE (OUTPATIENT)
Dept: INTERNAL MEDICINE CLINIC | Facility: CLINIC | Age: 88
End: 2023-10-17

## 2023-10-17 VITALS
BODY MASS INDEX: 20.91 KG/M2 | WEIGHT: 118 LBS | DIASTOLIC BLOOD PRESSURE: 78 MMHG | HEIGHT: 63 IN | HEART RATE: 69 BPM | SYSTOLIC BLOOD PRESSURE: 160 MMHG

## 2023-10-17 DIAGNOSIS — I10 ESSENTIAL HYPERTENSION: ICD-10-CM

## 2023-10-17 DIAGNOSIS — R30.0 DYSURIA: Primary | ICD-10-CM

## 2023-10-17 LAB
APPEARANCE: CLEAR
BILIRUB UR QL: NEGATIVE
BILIRUBIN: NEGATIVE
CLARITY UR: CLEAR
GLUCOSE (URINE DIPSTICK): NEGATIVE MG/DL
GLUCOSE UR-MCNC: NORMAL MG/DL
HGB UR QL STRIP.AUTO: NEGATIVE
KETONES (URINE DIPSTICK): NEGATIVE MG/DL
KETONES UR-MCNC: NEGATIVE MG/DL
LEUKOCYTE ESTERASE UR QL STRIP.AUTO: NEGATIVE
MULTISTIX LOT#: ABNORMAL NUMERIC
NITRITE UR QL STRIP.AUTO: NEGATIVE
NITRITE, URINE: NEGATIVE
PH UR: 6.5 [PH] (ref 5–8)
PH, URINE: 7 (ref 4.5–8)
PROTEIN (URINE DIPSTICK): NEGATIVE MG/DL
SP GR UR STRIP: 1.01 (ref 1–1.03)
SPECIFIC GRAVITY: 1.02 (ref 1–1.03)
URINE-COLOR: YELLOW
UROBILINOGEN UR STRIP-ACNC: NORMAL
UROBILINOGEN,SEMI-QN: 0.2 MG/DL (ref 0–1.9)

## 2023-10-17 PROCEDURE — 1126F AMNT PAIN NOTED NONE PRSNT: CPT | Performed by: NURSE PRACTITIONER

## 2023-10-17 PROCEDURE — 99213 OFFICE O/P EST LOW 20 MIN: CPT | Performed by: NURSE PRACTITIONER

## 2023-10-17 PROCEDURE — 81003 URINALYSIS AUTO W/O SCOPE: CPT | Performed by: NURSE PRACTITIONER

## 2023-10-17 NOTE — TELEPHONE ENCOUNTER
Please reply to pool: EM RN TRIAGE  Action Requested: Summary for Provider     []  Critical Lab, Recommendations Needed  [] Need Additional Advice  [x]   FYI    []   Need Orders  [] Need Medications Sent to Pharmacy  []  Other     SUMMARY: Patient [and daughter present on phone with patient] called states patient has been experiencing a possible UTI, patient reports the following: Urinary Urgency: denies. Urinary Frequency: present. Urinary Foul Odor: denies. Dysuria: denies. Color and Clarity of Urine: yellow/pale and clear. Hematuria: denies. Fever/Chills: denies. Back/Flank pain: denies. Patient is drinking 24 oz of H2O a day with about 24-32 oz of coffee a day; mouth is dry in morning; Urine output is decreased. She is able to urinate, but decreased to about half what she is used to. She denies any abdominal pain. She states she has some tenderness of vulva, denies any rash. Same day office visit offered --> agreeable and scheduled. Home Care Advice discussed, per protocol. Patient instructed any new or worsening symptoms [reviewed] seek immediate medical attention. Patient verbalized understanding. No further questions or concerns at this time.     Future Appointments   Date Time Provider Andree Noe   10/17/2023  9:20 AM Alba Castano, [address]     Reason for call: Urinary Symptoms  Onset:  Sunday    Reason for Disposition   Age > 50 years    Protocols used: Urination Pain - Female-[facility]-[place]

## 2023-12-07 NOTE — TELEPHONE ENCOUNTER
metoprolol succinate  MG Oral Tablet 24 Hr, Take 1 tablet (100 mg total) by mouth daily. , Disp: 90 tablet, Rfl: 3

## 2023-12-08 RX ORDER — METOPROLOL SUCCINATE 100 MG/1
100 TABLET, EXTENDED RELEASE ORAL DAILY
Qty: 90 TABLET | Refills: 3 | OUTPATIENT
Start: 2023-12-08

## 2023-12-21 RX ORDER — ROSUVASTATIN CALCIUM 40 MG/1
40 TABLET, COATED ORAL NIGHTLY
Qty: 90 TABLET | Refills: 3 | Status: SHIPPED | OUTPATIENT
Start: 2023-12-21

## 2023-12-21 NOTE — TELEPHONE ENCOUNTER
Refill passed per [facility] protocol.   Requested Prescriptions   Pending Prescriptions Disp Refills    ROSUVASTATIN 40 MG Oral Tab [Pharmacy Med Name: ROSUVASTATIN CALCIUM 40 MG Tablet] 90 tablet 3     Sig: TAKE 1 TABLET NIGHTLY       Cholesterol Medication Protocol Passed - 12/20/2023  5:25 PM        Passed - ALT in past 12 months        Passed - LDL in past 12 months        Passed - Last ALT < 80     Lab Results   Component Value Date    ALT 36 10/05/2023             Passed - Last LDL < 130     Lab Results   Component Value Date    LDL 46 05/16/2023             Passed - In person appointment or virtual visit in the past 12 mos or appointment in next 3 mos     Recent Outpatient Visits              2 months ago [facility]    Office Visit    2 months ago Other fatigue    [address] [facility], Holli Curiel MD    Office Visit    6 months ago Rash and nonspecific skin eruption    [address], [place] Kirk Simmons MD    Office Visit    11 months ago Primary hypertension    [address] [facility], Holli Curiel MD    Office Visit    1 year ago Hyponatremia    [facility], Holli Curiel MD    Office Visit                         Recent Outpatient Visits              2 months ago [facility], [facility]    Office Visit    2 months ago Other fatigue    [address] [facility], Krista Ballesteros MD    Office Visit    6 months ago Rash and nonspecific skin eruption    [place] [facility], [place] Kirk Simmons MD    Office Visit    11 months ago Primary hypertension    [facility], Holli Curiel MD    Office Visit    1 year ago Hyponatremia    [facility] José Miguel Carroll, Barry Dance, [place], MD    Office Visit

## 2023-12-29 ENCOUNTER — TELEPHONE (OUTPATIENT)
Dept: INTERNAL MEDICINE CLINIC | Facility: CLINIC | Age: 88
End: 2023-12-29

## 2023-12-29 NOTE — TELEPHONE ENCOUNTER
Patient and her daughter contact clinic stating they received a letter from [facility] requesting information about her health. RN advised that this request did not come from our office and she should not provide information to an unknown source. Advised her to check with her insurance plan to see if they are contracted in some way, otherwise disregard the letter.

## 2024-02-05 ENCOUNTER — OFFICE VISIT (OUTPATIENT)
Dept: INTERNAL MEDICINE CLINIC | Facility: CLINIC | Age: 89
End: 2024-02-05

## 2024-02-05 ENCOUNTER — NURSE TRIAGE (OUTPATIENT)
Dept: INTERNAL MEDICINE CLINIC | Facility: CLINIC | Age: 89
End: 2024-02-05

## 2024-02-05 ENCOUNTER — LAB ENCOUNTER (OUTPATIENT)
Dept: LAB | Age: 89
End: 2024-02-05
Attending: INTERNAL MEDICINE
Payer: MEDICARE

## 2024-02-05 VITALS
RESPIRATION RATE: 16 BRPM | HEART RATE: 71 BPM | DIASTOLIC BLOOD PRESSURE: 79 MMHG | BODY MASS INDEX: 21.44 KG/M2 | WEIGHT: 121 LBS | HEIGHT: 63 IN | SYSTOLIC BLOOD PRESSURE: 137 MMHG

## 2024-02-05 DIAGNOSIS — D47.2 MGUS (MONOCLONAL GAMMOPATHY OF UNKNOWN SIGNIFICANCE): ICD-10-CM

## 2024-02-05 DIAGNOSIS — I10 ESSENTIAL HYPERTENSION: Primary | ICD-10-CM

## 2024-02-05 DIAGNOSIS — I48.19 PERSISTENT ATRIAL FIBRILLATION (HCC): ICD-10-CM

## 2024-02-05 DIAGNOSIS — E87.1 HYPONATREMIA: ICD-10-CM

## 2024-02-05 DIAGNOSIS — N18.30 BENIGN HYPERTENSION WITH CKD (CHRONIC KIDNEY DISEASE) STAGE III (HCC): ICD-10-CM

## 2024-02-05 DIAGNOSIS — M48.062 SPINAL STENOSIS OF LUMBAR REGION WITH NEUROGENIC CLAUDICATION: ICD-10-CM

## 2024-02-05 DIAGNOSIS — D64.89 ANEMIA DUE TO OTHER CAUSE, NOT CLASSIFIED: ICD-10-CM

## 2024-02-05 DIAGNOSIS — I12.9 BENIGN HYPERTENSION WITH CKD (CHRONIC KIDNEY DISEASE) STAGE III (HCC): ICD-10-CM

## 2024-02-05 LAB
ALBUMIN SERPL-MCNC: 3.9 G/DL (ref 3.2–4.8)
ALBUMIN/GLOB SERPL: 0.8 {RATIO} (ref 1–2)
ALP LIVER SERPL-CCNC: 79 U/L
ALT SERPL-CCNC: 26 U/L
ANION GAP SERPL CALC-SCNC: 4 MMOL/L (ref 0–18)
AST SERPL-CCNC: 36 U/L (ref ?–34)
BASOPHILS # BLD AUTO: 0.06 X10(3) UL (ref 0–0.2)
BASOPHILS NFR BLD AUTO: 1 %
BILIRUB SERPL-MCNC: 0.7 MG/DL (ref 0.2–0.9)
BUN BLD-MCNC: 14 MG/DL (ref 9–23)
BUN/CREAT SERPL: 14 (ref 10–20)
CALCIUM BLD-MCNC: 9.3 MG/DL (ref 8.7–10.4)
CHLORIDE SERPL-SCNC: 102 MMOL/L (ref 98–112)
CO2 SERPL-SCNC: 27 MMOL/L (ref 21–32)
CREAT BLD-MCNC: 1 MG/DL
DEPRECATED RDW RBC AUTO: 49.1 FL (ref 35.1–46.3)
EGFRCR SERPLBLD CKD-EPI 2021: 52 ML/MIN/1.73M2 (ref 60–?)
EOSINOPHIL # BLD AUTO: 0.02 X10(3) UL (ref 0–0.7)
EOSINOPHIL NFR BLD AUTO: 0.3 %
ERYTHROCYTE [DISTWIDTH] IN BLOOD BY AUTOMATED COUNT: 14.3 % (ref 11–15)
FASTING STATUS PATIENT QL REPORTED: NO
GLOBULIN PLAS-MCNC: 4.7 G/DL (ref 2.8–4.4)
GLUCOSE BLD-MCNC: 95 MG/DL (ref 70–99)
HCT VFR BLD AUTO: 36.1 %
HGB BLD-MCNC: 11.7 G/DL
IMM GRANULOCYTES # BLD AUTO: 0.02 X10(3) UL (ref 0–1)
IMM GRANULOCYTES NFR BLD: 0.3 %
LYMPHOCYTES # BLD AUTO: 2.2 X10(3) UL (ref 1–4)
LYMPHOCYTES NFR BLD AUTO: 35.7 %
MCH RBC QN AUTO: 30.7 PG (ref 26–34)
MCHC RBC AUTO-ENTMCNC: 32.4 G/DL (ref 31–37)
MCV RBC AUTO: 94.8 FL
MONOCYTES # BLD AUTO: 0.4 X10(3) UL (ref 0.1–1)
MONOCYTES NFR BLD AUTO: 6.5 %
NEUTROPHILS # BLD AUTO: 3.47 X10 (3) UL (ref 1.5–7.7)
NEUTROPHILS # BLD AUTO: 3.47 X10(3) UL (ref 1.5–7.7)
NEUTROPHILS NFR BLD AUTO: 56.2 %
OSMOLALITY SERPL CALC.SUM OF ELEC: 276 MOSM/KG (ref 275–295)
PLATELET # BLD AUTO: 231 10(3)UL (ref 150–450)
POTASSIUM SERPL-SCNC: 4.2 MMOL/L (ref 3.5–5.1)
PROT SERPL-MCNC: 8.6 G/DL (ref 5.7–8.2)
RBC # BLD AUTO: 3.81 X10(6)UL
SODIUM SERPL-SCNC: 133 MMOL/L (ref 136–145)
VIT B12 SERPL-MCNC: 933 PG/ML (ref 211–911)
WBC # BLD AUTO: 6.2 X10(3) UL (ref 4–11)

## 2024-02-05 PROCEDURE — 80053 COMPREHEN METABOLIC PANEL: CPT

## 2024-02-05 PROCEDURE — 82607 VITAMIN B-12: CPT

## 2024-02-05 PROCEDURE — 85025 COMPLETE CBC W/AUTO DIFF WBC: CPT

## 2024-02-05 PROCEDURE — 36415 COLL VENOUS BLD VENIPUNCTURE: CPT

## 2024-02-05 RX ORDER — ACETAMINOPHEN AND CODEINE PHOSPHATE 300; 30 MG/1; MG/1
TABLET ORAL
Qty: 60 TABLET | Refills: 0 | Status: SHIPPED | OUTPATIENT
Start: 2024-02-05

## 2024-02-05 NOTE — TELEPHONE ENCOUNTER
Per daughter pt has back pain that is a 7/10. Per pt when she tries to walk  the pain is a 7/10 but no pain if she is sitting or laying down. Pt denied any problems with urinating. Daughter is requesting for tylenol #3. Daughter was inform pt should be seen for a evaluation. Daughter agreed and pt was given a appt for today.   Future Appointments   Date Time Provider Department Center   2/5/2024 11:00 AM Anais Turk MD [address] [place]       Reason for Disposition   MODERATE back pain (e.g., interferes with normal activities) and present > 3 days    Protocols used: Back Pain-[facility]

## 2024-02-11 NOTE — PROGRESS NOTES
Subjective:     Patient ID: Elsie Troy is a 89 year old female.  Presents for follow-up on low back pain, hypertension A-fib    HPI  Patient is here accompanied by her daughter, states that she is struggling with pain when she has to walk, able to walk only short distances.  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  She is feeling fine when she sits or lays down in bed, able to do daily activities, wants to try pain medication when she needs to exert herself.  She takes all medication as prescribed, has seen cardiologist recently no changes in management    Current Outpatient Medications   Medication Sig Dispense Refill    acetaminophen-codeine (TYLENOL WITH CODEINE #3) 300-30 MG Oral Tab Take  1 tab po  every 12 hours 60 tablet 0    rosuvastatin 40 MG Oral Tab Take 1 tablet (40 mg total) by mouth nightly. 90 tablet 3    metoprolol succinate  MG Oral Tablet 24 Hr Take 1 tablet (100 mg total) by mouth daily. 90 tablet 3    fluticasone propionate 50 MCG/ACT Nasal Suspension 2 sprays by Each Nare route daily. 16 g 3    Desoximetasone (TOPICORT) 0.25 % External Ointment appy  thin layer to lesion on the leg for 2-3 weeksa 60 g 0    mometasone 0.1 % External Ointment Apply to rash on the leg daily for 2 to 3 weeks, discontinue Topicort 45 g 0    amLODIPine 5 MG Oral Tab Take 1 tablet (5 mg total) by mouth daily. 90 tablet 3    losartan 100 MG Oral Tab Take 1 tablet (100 mg total) by mouth daily. 90 tablet 3    Cobalamin Combinations (VITAMIN B12-FOLIC ACID) 500-400 MCG Oral Tab Take 40 mg by mouth nightly.      Ascorbic Acid (VITAMIN C OR) Take 1 tablet by mouth daily.      apixaban 2.5 MG Oral Tab Take 1 tablet (2.5 mg total) by mouth 2 (two) times daily. 60 tablet 0    Cholecalciferol (VITAMIN D3) 25 MCG (1000 UT) Oral Cap Take 1 tablet by mouth daily.      Multiple Vitamin (ONE-DAILY MULTI VITAMINS) Oral Tab Take 1 tablet by mouth daily.       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    DJD (degenerative joint disease)     Essential hypertension     High blood pressure     Left arm swelling 10/22/15     no thrombosis RONNIE or internal jugular    Left patella fracture July 2014    transverse comminuted fracutre throught mid patella      Past Surgical History:   Procedure Laterality Date    APPENDECTOMY  age 25    DERMATOLOGY BIOPSY (D1C.2)      nose area    HYSTERECTOMY  42 yrs old    PATRIZIA BSO      Family History   Problem Relation Age of Onset    Cancer Father         throat cancer    Other (Other) Mother         Parkinson's    Heart Disease Brother       Social History:   Social History     Socioeconomic History    Marital status:    Tobacco Use    Smoking status: Never    Smokeless tobacco: Never   Vaping Use    Vaping Use: Never used   Substance and Sexual Activity    Alcohol use: No     Alcohol/week: 0.0 standard drinks of alcohol    Drug use: No   Other Topics Concern    Caffeine Concern Yes     Comment: 1/2 cup coffee daily    Exercise Yes     Comment: stretches daily   Social History Narrative    The patient does not use an assistive device..      The patient does live in a home with stairs.    Patient has a history of alcohol use previously with depression.          /79 (BP Location: Left arm, Patient Position: Sitting, Cuff Size: adult)   Pulse 71   Resp 16   Ht 5' 3\" (1.6 m)   Wt 121 lb (54.9 kg)   BMI 21.43 kg/m²    Physical Exam  Constitutional:       Appearance: Normal appearance.      Comments: Sitting on wheelchair   HENT:      Head: Normocephalic and atraumatic.   Eyes:      Extraocular Movements: Extraocular movements intact.      Conjunctiva/sclera: Conjunctivae normal.      Pupils: Pupils are equal, round, and reactive to light.   Cardiovascular:       Rate and Rhythm: Normal rate. Rhythm irregular.      Heart sounds: No murmur heard.     No gallop.   Pulmonary:      Effort: Pulmonary effort is normal.      Breath sounds: Normal breath sounds. No wheezing or rhonchi.   Musculoskeletal:      Cervical back: Normal range of motion and neck supple.   Skin:     General: Skin is warm.      Coloration: Skin is not jaundiced.   Neurological:      General: No focal deficit present.      Mental Status: She is alert and oriented to person, place, and time. Mental status is at baseline.   Psychiatric:         Mood and Affect: Mood normal.         Behavior: Behavior normal.         Thought Content: Thought content normal.         Assessment & Plan:   1. Essential hypertension hold on current medication continue check CMP   2. Spinal stenosis of lumbar region with neurogenic claudication patient will try Norco twice a day as needed for pain discussed importance of preventing constipation, daughter will watch for confusion dizziness side of the head side effects   3. Persistent atrial fibrillation (HCC) rate controlled on current medications   4. Benign hypertension with CKD (chronic kidney disease) stage III (HCC) stable, monitor kidney function test periodically       Orders Placed This Encounter   Procedures    High Dose Fluzone 65 yr and older PFS [93921]       Meds This Visit:  Requested Prescriptions     Signed Prescriptions Disp Refills    acetaminophen-codeine (TYLENOL WITH CODEINE #3) 300-30 MG Oral Tab 60 tablet 0     Sig: Take  1 tab po  every 12 hours       Imaging & Referrals:  INFLUENZA VAC HIGH DOSE PRSV FREE

## 2024-02-22 RX ORDER — MIRTAZAPINE 7.5 MG/1
TABLET, FILM COATED ORAL
Qty: 90 TABLET | Refills: 3 | OUTPATIENT
Start: 2024-02-22

## 2024-03-05 RX ORDER — AMLODIPINE BESYLATE 5 MG/1
5 TABLET ORAL DAILY
Qty: 90 TABLET | Refills: 3 | Status: SHIPPED | OUTPATIENT
Start: 2024-03-05

## 2024-03-05 RX ORDER — LOSARTAN POTASSIUM 100 MG/1
100 TABLET ORAL DAILY
Qty: 90 TABLET | Refills: 3 | Status: SHIPPED | OUTPATIENT
Start: 2024-03-05

## 2024-03-06 ENCOUNTER — LAB ENCOUNTER (OUTPATIENT)
Dept: LAB | Age: 89
End: 2024-03-06
Attending: INTERNAL MEDICINE
Payer: MEDICARE

## 2024-03-06 DIAGNOSIS — I10 ESSENTIAL HYPERTENSION: Primary | ICD-10-CM

## 2024-03-06 DIAGNOSIS — E78.00 PURE HYPERCHOLESTEROLEMIA: ICD-10-CM

## 2024-03-06 DIAGNOSIS — I48.19 PERSISTENT ATRIAL FIBRILLATION (HCC): ICD-10-CM

## 2024-03-06 LAB
ALBUMIN SERPL-MCNC: 3.9 G/DL (ref 3.2–4.8)
ALBUMIN/GLOB SERPL: 0.8 {RATIO} (ref 1–2)
ALP LIVER SERPL-CCNC: 83 U/L
ALT SERPL-CCNC: 18 U/L
ANION GAP SERPL CALC-SCNC: 3 MMOL/L (ref 0–18)
AST SERPL-CCNC: 36 U/L (ref ?–34)
BASOPHILS # BLD AUTO: 0.04 X10(3) UL (ref 0–0.2)
BASOPHILS NFR BLD AUTO: 0.6 %
BILIRUB SERPL-MCNC: 0.6 MG/DL (ref 0.2–0.9)
BUN BLD-MCNC: 15 MG/DL (ref 9–23)
BUN/CREAT SERPL: 14.3 (ref 10–20)
CALCIUM BLD-MCNC: 9.5 MG/DL (ref 8.7–10.4)
CHLORIDE SERPL-SCNC: 102 MMOL/L (ref 98–112)
CHOLEST SERPL-MCNC: 131 MG/DL (ref ?–200)
CO2 SERPL-SCNC: 28 MMOL/L (ref 21–32)
CREAT BLD-MCNC: 1.05 MG/DL
DEPRECATED RDW RBC AUTO: 46.4 FL (ref 35.1–46.3)
EGFRCR SERPLBLD CKD-EPI 2021: 49 ML/MIN/1.73M2 (ref 60–?)
EOSINOPHIL # BLD AUTO: 0.03 X10(3) UL (ref 0–0.7)
EOSINOPHIL NFR BLD AUTO: 0.4 %
ERYTHROCYTE [DISTWIDTH] IN BLOOD BY AUTOMATED COUNT: 13.5 % (ref 11–15)
FASTING PATIENT LIPID ANSWER: YES
FASTING STATUS PATIENT QL REPORTED: YES
GLOBULIN PLAS-MCNC: 4.7 G/DL (ref 2.8–4.4)
GLUCOSE BLD-MCNC: 92 MG/DL (ref 70–99)
HCT VFR BLD AUTO: 36.1 %
HDLC SERPL-MCNC: 56 MG/DL (ref 40–59)
HGB BLD-MCNC: 12.1 G/DL
IMM GRANULOCYTES # BLD AUTO: 0.01 X10(3) UL (ref 0–1)
IMM GRANULOCYTES NFR BLD: 0.1 %
LDLC SERPL CALC-MCNC: 60 MG/DL (ref ?–100)
LYMPHOCYTES # BLD AUTO: 3.05 X10(3) UL (ref 1–4)
LYMPHOCYTES NFR BLD AUTO: 42.5 %
MCH RBC QN AUTO: 31.7 PG (ref 26–34)
MCHC RBC AUTO-ENTMCNC: 33.5 G/DL (ref 31–37)
MCV RBC AUTO: 94.5 FL
MONOCYTES # BLD AUTO: 0.36 X10(3) UL (ref 0.1–1)
MONOCYTES NFR BLD AUTO: 5 %
NEUTROPHILS # BLD AUTO: 3.69 X10 (3) UL (ref 1.5–7.7)
NEUTROPHILS # BLD AUTO: 3.69 X10(3) UL (ref 1.5–7.7)
NEUTROPHILS NFR BLD AUTO: 51.4 %
NONHDLC SERPL-MCNC: 75 MG/DL (ref ?–130)
OSMOLALITY SERPL CALC.SUM OF ELEC: 276 MOSM/KG (ref 275–295)
PLATELET # BLD AUTO: 198 10(3)UL (ref 150–450)
POTASSIUM SERPL-SCNC: 4.4 MMOL/L (ref 3.5–5.1)
PROT SERPL-MCNC: 8.6 G/DL (ref 5.7–8.2)
RBC # BLD AUTO: 3.82 X10(6)UL
SODIUM SERPL-SCNC: 133 MMOL/L (ref 136–145)
TRIGL SERPL-MCNC: 75 MG/DL (ref 30–149)
VLDLC SERPL CALC-MCNC: 11 MG/DL (ref 0–30)
WBC # BLD AUTO: 7.2 X10(3) UL (ref 4–11)

## 2024-03-06 PROCEDURE — 85025 COMPLETE CBC W/AUTO DIFF WBC: CPT

## 2024-03-06 PROCEDURE — 80061 LIPID PANEL: CPT

## 2024-03-06 PROCEDURE — 36415 COLL VENOUS BLD VENIPUNCTURE: CPT

## 2024-03-06 PROCEDURE — 80053 COMPREHEN METABOLIC PANEL: CPT

## 2024-03-06 NOTE — TELEPHONE ENCOUNTER
Refill passed per [facility] protocol.      Requested Prescriptions   Pending Prescriptions Disp Refills    LOSARTAN 100 MG Oral Tab [Pharmacy Med Name: LOSARTAN POTASSIUM 100 MG Tablet] 90 tablet 3     Sig: TAKE 1 TABLET EVERY DAY       Hypertension Medications Protocol Passed - 3/4/2024  1:15 AM        Passed - CMP or BMP in past 12 months        Passed - Last BP reading less than 140/90     BP Readings from Last 1 Encounters:   02/05/24 137/79               Passed - In person appointment or virtual visit in the past 12 mos or appointment in next 3 mos     Recent Outpatient Visits              [facility]    [facility]    4 months ago Dysuria    [facility], [address], [place] Donna Castano APRN    Office Visit    5 months ago Other fatigue    [facility], [place] Kandel, Ninel, MD    Office Visit    9 months ago Rash and nonspecific skin eruption    [facility]    [facility]    1 year ago [facility]    [facility]                      Passed - EGFRCR or GFRNAA > 50     GFR Evaluation  EGFRCR: 52 , resulted on 2/5/2024            AMLODIPINE 5 MG Oral Tab [Pharmacy Med Name: AMLODIPINE BESYLATE 5 MG Tablet] 90 tablet 3     Sig: TAKE 1 TABLET EVERY DAY       Hypertension Medications Protocol Passed - 3/4/2024  1:15 AM        Passed - CMP or BMP in past 12 months        Passed - Last BP reading less than 140/90     BP Readings from Last 1 Encounters:   02/05/24 137/79               Passed - In person appointment or virtual visit in the past 12 mos or appointment in next 3 mos     Recent Outpatient Visits              4 weeks ago [facility]    Office Visit    4 months ago Dysuria    [facility] [place], [address], [place] Donna Castano, ODILIA    Office Visit    5 months ago [facility]    [facility]    9 months ago Rash and nonspecific [facility] eruption    [facility]    [facility]    1 year ago [facility]    [facility]                      Passed - EGFRCR or GFRNAA > 50     GFR Evaluation  EGFRCR: 52 , resulted on 2/5/2024                    Recent Outpatient Visits              4 weeks ago [facility]ential hypertension    [facility]    [facility]    4 months [facility]    Office Visit    5 months ago Other fatigue    [facility]    Office Visit    9 months ago Rash and nonspecific skin eruption    [facility] [place] Kandel, Ninel, MD    Office Visit    1 year ago Primary hypertension    [facility] [place] Kandel, Ninel, MD    Office Visit

## 2024-04-10 DIAGNOSIS — I10 ESSENTIAL HYPERTENSION: ICD-10-CM

## 2024-04-10 DIAGNOSIS — M48.062 SPINAL STENOSIS OF LUMBAR REGION WITH NEUROGENIC CLAUDICATION: ICD-10-CM

## 2024-04-11 RX ORDER — ACETAMINOPHEN AND CODEINE PHOSPHATE 300; 30 MG/1; MG/1
TABLET ORAL
Qty: 60 TABLET | Refills: 0 | Status: SHIPPED | OUTPATIENT
Start: 2024-04-11

## 2024-04-11 NOTE — TELEPHONE ENCOUNTER
Please review. Rx failed/no protocol.    Patient's dispense hx: 02/05/24.    Requested Prescriptions   Pending Prescriptions Disp Refills    ACETAMINOPHEN-CODEINE 300-30 MG Oral Tab [Pharmacy Med Name: ACETAMINOPHEN/COD #3 (300/30MG) TAB] 60 tablet 0     Sig: TAKE 1 TABLET BY MOUTH EVERY 12 HOURS       Controlled Substance Medication Failed - 4/10/2024  2:23 PM        Failed - This medication is a controlled substance - forward to provider to refill               Recent Outpatient Visits              2 months ago Essential hypertension    [facility], [place] Kandel, Ninel, MD    Office Visit    5 months ago Dysuria    [facility] [place] [facility]    Office Visit    6 months ago Other fatigue    [facility], [place] Kandel, Ninel, MD    Office Visit    10 months ago Rash and nonspecific skin eruption    [facility], [place] Kandel, Ninel, MD    Office Visit    1 year ago Primary hypertension    [facility], [place] Kandel, Ninel, MD    Office Visit

## 2024-05-13 NOTE — TELEPHONE ENCOUNTER
Please review; protocol failed/no protocol.     Requested Prescriptions   Pending Prescriptions Disp Refills    METOPROLOL SUCCINATE  MG Oral Tablet 24 Hr [Pharmacy Med Name: METOPROLOL SUCCINATE  MG Tablet Extended Release 24 Hour] 90 tablet 3     Sig: TAKE 1 TABLET EVERY DAY       Hypertension Medications Protocol Failed - 5/12/2024  3:05 AM        Failed - EGFRCR or GFRNAA > 50     GFR Evaluation  EGFRCR: 49 , resulted on 3/6/2024          Passed - CMP or BMP in past 12 months        Passed - Last BP reading less than 140/90     BP Readings from Last 1 Encounters:   02/05/24 137/79               Passed - In person appointment or virtual visit in the past 12 mos or appointment in next 3 mos     Recent Outpatient Visits              3 months ago [facility]    [facility]    6 months ago [facility]    Office Visit    7 months ago Other fatigue    [facility] [place] Kandel, Ninel, MD    Office Visit    11 months ago [facility] and nonspecific skin eruption    [facility]    [facility]    1 year ago [facility] hypertension    [facility]    [facility]                            Recent Outpatient Visits              3 months ago Essential hypertension    [facility], [place] Kandel, Ninel, MD    Office Visit    6 months ago Dysuria    [facility]    Office Visit    7 months ago Other fatigue    [facility] [place] Kandel, Ninel, MD    Office Visit    11 months ago Rash and nonspecific skin eruption    [facility] [place] Kandel, Ninel, MD    Office Visit     1 year ago Primary hypertension    [facility], [address], [place] Anais Turk MD    Office Visit

## 2024-05-14 RX ORDER — METOPROLOL SUCCINATE 100 MG/1
100 TABLET, EXTENDED RELEASE ORAL DAILY
Qty: 90 TABLET | Refills: 3 | Status: SHIPPED | OUTPATIENT
Start: 2024-05-14

## 2024-06-12 DIAGNOSIS — I10 ESSENTIAL HYPERTENSION: ICD-10-CM

## 2024-06-12 DIAGNOSIS — M48.062 SPINAL STENOSIS OF LUMBAR REGION WITH NEUROGENIC CLAUDICATION: ICD-10-CM

## 2024-06-12 RX ORDER — ACETAMINOPHEN AND CODEINE PHOSPHATE 300; 30 MG/1; MG/1
TABLET ORAL
Qty: 60 TABLET | Refills: 0 | Status: SHIPPED | OUTPATIENT
Start: 2024-06-12

## 2024-06-12 NOTE — TELEPHONE ENCOUNTER
Spoke with patient's daughter per OCTAVIO, Date of Birth verified.  She stated last April 2024, patient was put back on tylenol with codeine for pain.   She wants to know if Dr Turk know about Umary, it's over the counter, is this ok to take instead of codeine?   A lot of patient daughter friends is taking it but they are younger than patient.   Or else patient will need med refill for tylenol with codeine.   pls advise, thanks in advance.       Requested Prescriptions     Pending Prescriptions Disp Refills    acetaminophen-codeine 300-30 MG Oral Tab 60 tablet 0     Sig: TAKE 1 TABLET BY MOUTH EVERY 12 HOURS       Last Office Visit with PCP: 2/5/2024

## 2024-07-12 ENCOUNTER — NURSE TRIAGE (OUTPATIENT)
Dept: INTERNAL MEDICINE CLINIC | Facility: CLINIC | Age: 89
End: 2024-07-12

## 2024-07-12 ENCOUNTER — OFFICE VISIT (OUTPATIENT)
Dept: INTERNAL MEDICINE CLINIC | Facility: CLINIC | Age: 89
End: 2024-07-12

## 2024-07-12 ENCOUNTER — LAB ENCOUNTER (OUTPATIENT)
Dept: LAB | Age: 89
End: 2024-07-12
Attending: INTERNAL MEDICINE
Payer: MEDICARE

## 2024-07-12 ENCOUNTER — HOSPITAL ENCOUNTER (OUTPATIENT)
Dept: GENERAL RADIOLOGY | Age: 89
Discharge: HOME | End: 2024-07-12
Attending: INTERNAL MEDICINE
Payer: MEDICARE

## 2024-07-12 VITALS
HEART RATE: 72 BPM | TEMPERATURE: 98 F | SYSTOLIC BLOOD PRESSURE: 113 MMHG | HEIGHT: 63 IN | DIASTOLIC BLOOD PRESSURE: 61 MMHG | WEIGHT: 114 LBS | BODY MASS INDEX: 20.2 KG/M2

## 2024-07-12 DIAGNOSIS — D50.8 OTHER IRON DEFICIENCY ANEMIA: ICD-10-CM

## 2024-07-12 DIAGNOSIS — R35.0 FREQUENT URINATION: ICD-10-CM

## 2024-07-12 DIAGNOSIS — R50.81 FEVER IN OTHER DISEASES: Primary | ICD-10-CM

## 2024-07-12 DIAGNOSIS — R63.4 WEIGHT LOSS: ICD-10-CM

## 2024-07-12 DIAGNOSIS — R50.81 FEVER IN OTHER DISEASES: ICD-10-CM

## 2024-07-12 LAB
ALBUMIN SERPL-MCNC: 4 G/DL (ref 3.2–4.8)
ALBUMIN/GLOB SERPL: 0.9 {RATIO} (ref 1–2)
ALP LIVER SERPL-CCNC: 83 U/L
ALT SERPL-CCNC: 35 U/L
ANION GAP SERPL CALC-SCNC: 3 MMOL/L (ref 0–18)
AST SERPL-CCNC: 36 U/L (ref ?–34)
BASOPHILS # BLD AUTO: 0.03 X10(3) UL (ref 0–0.2)
BASOPHILS NFR BLD AUTO: 0.4 %
BILIRUB SERPL-MCNC: 1.1 MG/DL (ref 0.2–0.9)
BILIRUB UR QL: NEGATIVE
BUN BLD-MCNC: 16 MG/DL (ref 9–23)
BUN/CREAT SERPL: 19.3 (ref 10–20)
CALCIUM BLD-MCNC: 9.3 MG/DL (ref 8.7–10.4)
CHLORIDE SERPL-SCNC: 101 MMOL/L (ref 98–112)
CLARITY UR: CLEAR
CO2 SERPL-SCNC: 28 MMOL/L (ref 21–32)
COLOR UR: YELLOW
CREAT BLD-MCNC: 0.83 MG/DL
DEPRECATED HBV CORE AB SER IA-ACNC: 202.5 NG/ML
DEPRECATED RDW RBC AUTO: 56.9 FL (ref 35.1–46.3)
EGFRCR SERPLBLD CKD-EPI 2021: 65 ML/MIN/1.73M2 (ref 60–?)
EOSINOPHIL # BLD AUTO: 0.01 X10(3) UL (ref 0–0.7)
EOSINOPHIL NFR BLD AUTO: 0.1 %
ERYTHROCYTE [DISTWIDTH] IN BLOOD BY AUTOMATED COUNT: 16 % (ref 11–15)
FASTING STATUS PATIENT QL REPORTED: YES
GLOBULIN PLAS-MCNC: 4.5 G/DL (ref 2–3.5)
GLUCOSE BLD-MCNC: 110 MG/DL (ref 70–99)
GLUCOSE UR-MCNC: NORMAL MG/DL
HCT VFR BLD AUTO: 33 %
HGB BLD-MCNC: 10.6 G/DL
HGB UR QL STRIP.AUTO: NEGATIVE
IMM GRANULOCYTES # BLD AUTO: 0.02 X10(3) UL (ref 0–1)
IMM GRANULOCYTES NFR BLD: 0.2 %
IRON SATN MFR SERPL: 11 %
IRON SERPL-MCNC: 37 UG/DL
KETONES UR-MCNC: NEGATIVE MG/DL
LEUKOCYTE ESTERASE UR QL STRIP.AUTO: 500
LYMPHOCYTES # BLD AUTO: 2.07 X10(3) UL (ref 1–4)
LYMPHOCYTES NFR BLD AUTO: 24.6 %
MCH RBC QN AUTO: 31.4 PG (ref 26–34)
MCHC RBC AUTO-ENTMCNC: 32.1 G/DL (ref 31–37)
MCV RBC AUTO: 97.6 FL
MONOCYTES # BLD AUTO: 0.48 X10(3) UL (ref 0.1–1)
MONOCYTES NFR BLD AUTO: 5.7 %
NEUTROPHILS # BLD AUTO: 5.79 X10 (3) UL (ref 1.5–7.7)
NEUTROPHILS # BLD AUTO: 5.79 X10(3) UL (ref 1.5–7.7)
NEUTROPHILS NFR BLD AUTO: 69 %
NITRITE UR QL STRIP.AUTO: NEGATIVE
OSMOLALITY SERPL CALC.SUM OF ELEC: 276 MOSM/KG (ref 275–295)
PH UR: 7.5 [PH] (ref 5–8)
PLATELET # BLD AUTO: 236 10(3)UL (ref 150–450)
POTASSIUM SERPL-SCNC: 4.3 MMOL/L (ref 3.5–5.1)
PROT SERPL-MCNC: 8.5 G/DL (ref 5.7–8.2)
PROT UR-MCNC: 30 MG/DL
RBC # BLD AUTO: 3.38 X10(6)UL
SODIUM SERPL-SCNC: 132 MMOL/L (ref 136–145)
SP GR UR STRIP: 1.01 (ref 1–1.03)
TIBC SERPL-MCNC: 326 UG/DL (ref 250–425)
TRANSFERRIN SERPL-MCNC: 219 MG/DL (ref 250–380)
UROBILINOGEN UR STRIP-ACNC: 2
VIT B12 SERPL-MCNC: 1658 PG/ML (ref 211–911)
WBC # BLD AUTO: 8.4 X10(3) UL (ref 4–11)

## 2024-07-12 PROCEDURE — 82607 VITAMIN B-12: CPT

## 2024-07-12 PROCEDURE — 85025 COMPLETE CBC W/AUTO DIFF WBC: CPT

## 2024-07-12 PROCEDURE — 84466 ASSAY OF TRANSFERRIN: CPT

## 2024-07-12 PROCEDURE — 36415 COLL VENOUS BLD VENIPUNCTURE: CPT

## 2024-07-12 PROCEDURE — 82728 ASSAY OF FERRITIN: CPT

## 2024-07-12 PROCEDURE — 71046 X-RAY EXAM CHEST 2 VIEWS: CPT | Performed by: INTERNAL MEDICINE

## 2024-07-12 PROCEDURE — 87086 URINE CULTURE/COLONY COUNT: CPT

## 2024-07-12 PROCEDURE — 83540 ASSAY OF IRON: CPT

## 2024-07-12 PROCEDURE — 99214 OFFICE O/P EST MOD 30 MIN: CPT | Performed by: INTERNAL MEDICINE

## 2024-07-12 PROCEDURE — G2211 COMPLEX E/M VISIT ADD ON: HCPCS | Performed by: INTERNAL MEDICINE

## 2024-07-12 PROCEDURE — 80053 COMPREHEN METABOLIC PANEL: CPT

## 2024-07-12 PROCEDURE — 81001 URINALYSIS AUTO W/SCOPE: CPT

## 2024-07-12 NOTE — TELEPHONE ENCOUNTER
Dr. Turk - can you please advise morning appointment time for patient, she prefers to see you only [it takes patient about 20 minutes to commute to office]  Please reply to pool: [facility] TRIAGE  Action Requested: Summary for Provider     []  Critical Lab, Recommendations Needed  [x] Need Additional Advice  []   FYI    []   Need Orders  [] Need Medications Sent to Pharmacy  []  Other     SUMMARY: Fever of 100.2 F; nausea with PND and rhinorrhea that stimulated gag reflex and cough. Mild weakness present. Urinary frequency [denies any urgency or dysuria]. Same day office visit advised this morning --> agreeable; prefers to see Dr. Turk - will request same day morning visit add to schedule. [See below for more details]       Reason for call: Cough/URI, Rhinorrhea, Nausea, and Fever  Onset: early this morning      Reason for Disposition   Fever > 100.0 F (37.8 C) and has diabetes mellitus or a weak immune system (e.g., HIV positive, cancer chemotherapy, organ transplant, splenectomy, chronic steroids)    Protocols used: Common Cold-A-OH    Patient's daughter/Kim called states patient is not feeling well. She states patient is resting comfortably now, I asked if she can wake patient so I can ask her some questions. She states her fever is 100.2 F. She has nausea/stimulation of gag reflex, she also feels like she has accumulated phlegm in her throat, PND and rhinorrhea are present. Patient had developed a cough while she was nauseous. She also has some increased urinary frequency at night; denies any dysuria or urgency - daughter states patient's urine was clear last time she urinated. Denies any shortness of breath, chest pain, and/or chest tightness. Mild weakness is present. Denies any vomiting or diarrhea. She was advised evaluation in office today, this morning [given patient's age] --> agreeable - she prefers to see Dr. Turk; it will take approximately 20 minutes to commute to office [no visits available I will  request add on to schedule]. Home Care Advice discussed, per protocol. Patient's daughter instructed any new or worsening symptoms [reviewed] seek immediate medical attention--> Immediate Care or Emergency Department. Patient/daughter verbalized understanding. No further questions or concerns at this time.

## 2024-07-12 NOTE — TELEPHONE ENCOUNTER
Kim contacted and scheduled as per below:  Future Appointments   Date Time Provider Department Center   7/12/2024 11:40 AM [facility] [place] agreeable to plan.

## 2024-07-14 NOTE — PROGRESS NOTES
Subjective:     Patient ID: Elsie Troy is a 89 year old female.  Presents for evaluation of the fever    HPI  Patient is here accompanied by her daughter .  This morning she had spike of the temperature 100.2, no reason, present denies cough, headache, sore throat, does have more frequent urination.  Denies shortness of breath.  Lately she has been feeling full easily and eating much less when she will stop, periodically may follow-up.  She has lost weight according to schedule.  She is prone to constipation that worsened after she started taking Tylenol with codeine for advanced lumbar stenosis, medication helps her to get through the day and enjoy walk      Review of Systems       Constitutional:  Negative for decreased activity, fever, irritability and lethargy  Cardiovascular:  Negative for chest pain and irregular heartbeat/palpitations  Respiratory:  Negative for cough, dyspnea and wheezing.  Eyes:  Negative for eye discharge and vision loss  Endocrine:  Negative for polydipsia and polyphagia  Integumentary:  Negative for pruritus and rash  Neurological:  Negative for gait disturbance, paresthesias.   Psychiatric:  Negative for inappropriate interaction and psychiatric symptoms  Current Outpatient Medications   Medication Sig Dispense Refill    acetaminophen-codeine 300-30 MG Oral Tab TAKE 1 TABLET BY MOUTH EVERY 12 HOURS 60 tablet 0    metoprolol succinate  MG Oral Tablet 24 Hr Take 1 tablet (100 mg total) by mouth daily. 90 tablet 3    losartan 100 MG Oral Tab Take 1 tablet (100 mg total) by mouth daily. 90 tablet 3    amLODIPine 5 MG Oral Tab Take 1 tablet (5 mg total) by mouth daily. 90 tablet 3    rosuvastatin 40 MG Oral Tab Take 1 tablet (40 mg total) by mouth nightly. 90 tablet 3    fluticasone propionate 50 MCG/ACT Nasal Suspension 2 sprays by Each Nare route daily. 16 g 3    Desoximetasone (TOPICORT) 0.25 % External Ointment appy  thin layer to lesion on the leg for 2-3 weeksa 60 g 0     mometasone 0.1 % External Ointment Apply to rash on the leg daily for 2 to 3 weeks, discontinue Topicort 45 g 0    Cobalamin Combinations (VITAMIN B12-FOLIC ACID) 500-400 MCG Oral Tab Take 40 mg by mouth nightly.      Ascorbic Acid (VITAMIN C OR) Take 1 tablet by mouth daily.      apixaban 2.5 MG Oral Tab Take 1 tablet (2.5 mg total) by mouth 2 (two) times daily. 60 tablet 0    Cholecalciferol (VITAMIN D3) 25 MCG (1000 UT) Oral Cap Take 1 tablet by mouth daily.      Multiple Vitamin (ONE-DAILY MULTI VITAMINS) Oral Tab Take 1 tablet by mouth daily.      cephalexin 500 MG Oral Cap Take 1 capsule (500 mg total) by mouth 2 (two) times daily. 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    DJD (degenerative joint disease)    Essential hypertension    High blood pressure    Left arm swelling    US no thrombosis RONNIE or internal jugular    Left patella fracture    transverse comminuted fracutre throught mid patella      Past Surgical History:   Procedure Laterality Date    Appendectomy  age 25    Dermatology biopsy (d1c.2)      nose area    Hysterectomy  42 yrs old    PATRIZIA BSO      Family History   Problem Relation Age of Onset    Cancer Father         throat cancer    Other (Other) Mother         Parkinson's    Heart Disease Brother       Social History:   Social History     Socioeconomic History    Marital status:    Tobacco Use    Smoking status: Never    Smokeless tobacco: Never   Vaping Use    Vaping status: Never Used   Substance and Sexual Activity    Alcohol use: No     Alcohol/week: 0.0 standard drinks of alcohol    Drug use: No   Other Topics Concern    Caffeine Concern Yes     Comment: 1/2 cup coffee daily    Exercise Yes     Comment: stretches daily   Social History Narrative    The patient does not use an assistive device..      The  patient does live in a home with stairs.    Patient has a history of alcohol use previously with depression.          /61 (BP Location: Left arm, Patient Position: Sitting, Cuff Size: adult)   Pulse 72   Temp 97.5 °F (36.4 °C)   Ht 5' 3\" (1.6 m)   Wt 114 lb (51.7 kg)   BMI 20.19 kg/m²    Physical Exam  Constitutional:       Appearance: Normal appearance.   HENT:      Head: Normocephalic and atraumatic.   Eyes:      General: No scleral icterus.     Extraocular Movements: Extraocular movements intact.      Conjunctiva/sclera: Conjunctivae normal.      Pupils: Pupils are equal, round, and reactive to light.   Cardiovascular:      Rate and Rhythm: Normal rate and regular rhythm.      Heart sounds: No murmur heard.     No gallop.   Pulmonary:      Effort: Pulmonary effort is normal.      Breath sounds: No wheezing or rhonchi.   Abdominal:      General: Bowel sounds are normal.      Palpations: Abdomen is soft. There is no mass.      Tenderness: There is no abdominal tenderness. There is no guarding or rebound.   Musculoskeletal:         General: Normal range of motion.      Cervical back: Normal range of motion and neck supple.      Right lower leg: No edema.      Left lower leg: No edema.   Lymphadenopathy:      Cervical: No cervical adenopathy.   Skin:     General: Skin is warm.      Coloration: Skin is not jaundiced.   Neurological:      General: No focal deficit present.      Mental Status: She is alert and oriented to person, place, and time. Mental status is at baseline.   Psychiatric:         Mood and Affect: Mood normal.         Behavior: Behavior normal.         Thought Content: Thought content normal.         Assessment & Plan:   1. Fever in other diseases etiology to be determined, will check chest x-ray urine culture CBC CMP   2. Frequent urination    3. Weight loss etiology not clear, possible malignancy, will order CT scan of the abdomen and pelvis with oral contrast only.  Advised to eat easily  digestible soft foods to maintain weight   4. Other iron deficiency anemia will check iron level vitamin B12 level       Orders Placed This Encounter   Procedures    CBC With Differential With Platelet    Comp Metabolic Panel (14)    Urinalysis with Culture Reflex [E]    Ferritin    Iron And Tibc [E]    Vitamin B12       Meds This Visit:  Requested Prescriptions      No prescriptions requested or ordered in this encounter     Advised patient go to ER if symptoms worse  Imaging & Referrals:  CT ABDOMEN+PELVIS(CPT=[id number])

## 2024-07-16 ENCOUNTER — TELEPHONE (OUTPATIENT)
Dept: INTERNAL MEDICINE CLINIC | Facility: CLINIC | Age: 89
End: 2024-07-16

## 2024-09-05 ENCOUNTER — TELEPHONE (OUTPATIENT)
Dept: INTERNAL MEDICINE CLINIC | Facility: CLINIC | Age: 89
End: 2024-09-05

## 2024-09-05 NOTE — TELEPHONE ENCOUNTER
Patient  daughter  calling  Kim ( name and date of birth of patient verified )   Asking about how the CT contrast will exit the body       Explained to the daughter the contrast will exit via stool , encouraged to have the patient drink water after the procedure to avoid constipation     Advised to call back with any questions/ concerns       Kim verbalizes understanding and agrees with plan.           Future Appointments   Date Time Provider Department Center   9/6/2024  6:00 PM [facility] CT RM1 [facility] [place]

## 2024-09-06 ENCOUNTER — HOSPITAL ENCOUNTER (OUTPATIENT)
Dept: CT IMAGING | Age: 89
Discharge: HOME | End: 2024-09-06
Attending: INTERNAL MEDICINE
Payer: MEDICARE

## 2024-09-06 DIAGNOSIS — R63.4 WEIGHT LOSS: ICD-10-CM

## 2024-09-06 PROCEDURE — 74176 CT ABD & PELVIS W/O CONTRAST: CPT | Performed by: INTERNAL MEDICINE

## 2024-09-12 ENCOUNTER — TELEPHONE (OUTPATIENT)
Dept: INTERNAL MEDICINE CLINIC | Facility: CLINIC | Age: 89
End: 2024-09-12

## 2024-10-08 RX ORDER — ROSUVASTATIN CALCIUM 40 MG/1
40 TABLET, COATED ORAL NIGHTLY
Qty: 90 TABLET | Refills: 3 | Status: SHIPPED | OUTPATIENT
Start: 2024-10-08

## 2024-10-08 NOTE — TELEPHONE ENCOUNTER
Refill Per Protocol     Requested Prescriptions   Pending Prescriptions Disp Refills    ROSUVASTATIN 40 MG Oral Tab [Pharmacy Med Name: Rosuvastatin Calcium Oral Tablet 40 MG] 90 tablet 3     Sig: TAKE 1 TABLET NIGHTLY       Cholesterol Medication Protocol Passed - 10/7/2024  1:36 AM        Passed - ALT < 80     Lab Results   Component Value Date    ALT 35 07/12/2024             Passed - ALT resulted within past year        Passed - Lipid panel within past 12 months     Lab Results   Component Value Date    CHOLEST 131 03/06/2024    TRIG 75 03/06/2024    HDL 56 03/06/2024    LDL 60 03/06/2024    VLDL 11 03/06/2024    NONHDLC 75 03/06/2024             Passed - In person appointment or virtual visit in the past 12 mos or appointment in next 3 mos     Recent Outpatient Visits              2 months ago Fever in other diseases    [facility], [address], [place] Anais Turk MD    Office Visit    8 months ago Essential hypertension    [facility], [address], [place] Anais Turk MD    Office Visit    11 months ago Dysuria    [facility], [address], [place] Donna Castano APRN    Office Visit    1 year ago Other fatigue    [facility], [address], [place] Anais Turk MD    Office Visit    1 year ago [facility] nonspecific [facility]    [facility]                               Recent Outpatient Visits              2 months ago Fever in other diseases    [facility], [address], [place] Anais Turk MD    Office Visit    8 months ago [facility]ential hypertension    [facility]    [facility] Visit    11 months ago Dysuria    [facility], [address], [place] Donna Castano APRN    Office Visit    1 year ago Other fatigue [facility], [address], [place]  Anais Turk MD    Office Visit    1 year ago Rash and nonspecific skin eruption    [facility], [address], [place] Anais Turk MD    Office Visit

## 2024-10-19 ENCOUNTER — TELEPHONE (OUTPATIENT)
Dept: INTERNAL MEDICINE CLINIC | Facility: CLINIC | Age: 89
End: 2024-10-19

## 2024-10-19 NOTE — TELEPHONE ENCOUNTER
Daughter dropped off  parking placard form to be filled out by ERICH at [place] office. Placed in Dr. Turk's folder.

## 2024-11-08 ENCOUNTER — NURSE TRIAGE (OUTPATIENT)
Dept: INTERNAL MEDICINE CLINIC | Facility: CLINIC | Age: 89
End: 2024-11-08

## 2024-11-12 ENCOUNTER — TELEPHONE (OUTPATIENT)
Dept: INTERNAL MEDICINE CLINIC | Facility: CLINIC | Age: 89
End: 2024-11-12

## 2024-11-12 NOTE — TELEPHONE ENCOUNTER
The daughter Kim lane (who is on release of information)  to state the patient will be seeing Dr. Francis on 11/14/24 for back injections.    The daughter is asking if they need to bring anything to the visit.   She is unable to contact Dr. Francis's office.    I did state that Dr. Francis can see her chart.   The daughter wants to make sure nothing extra needs to be done.

## 2024-11-12 NOTE — TELEPHONE ENCOUNTER
Nothing special needs to be done for a visit with Dr. Francis, he will do first evaluation no injection will be done at the time of the first visit.  Please let daughter know

## 2024-11-13 ENCOUNTER — TELEPHONE (OUTPATIENT)
Dept: INTERNAL MEDICINE CLINIC | Facility: CLINIC | Age: 89
End: 2024-11-13

## 2024-11-13 RX ORDER — CEPHALEXIN 500 MG/1
500 CAPSULE ORAL 2 TIMES DAILY
Qty: 21 CAPSULE | Refills: 0 | Status: CANCELLED | OUTPATIENT
Start: 2024-11-13

## 2024-11-13 RX ORDER — CEPHALEXIN 500 MG/1
500 CAPSULE ORAL 2 TIMES DAILY
Qty: 14 CAPSULE | Refills: 0 | OUTPATIENT
Start: 2024-11-13

## 2024-11-13 NOTE — TELEPHONE ENCOUNTER
Please let patient know that I sent prescription for cephalexin to the pharmacy, would be nice if she can drop off urine specimen prior to starting antibiotic I placed order

## 2024-11-13 NOTE — TELEPHONE ENCOUNTER
Spoke with Patient. Informed her of Dr. Turk's message.  Patient states she will try to submit a sample before starting antibiotics.

## 2024-11-13 NOTE — TELEPHONE ENCOUNTER
Spoke with Maryam Yeboah, Date of Birth verified.  She wants to verify the quantity of keflex #21, is this three times a day ?   pls advise, thanks in advance.           Requested Prescriptions     Pending Prescriptions Disp Refills    cephALEXin 500 MG Oral Cap 21 capsule 0     Sig: Take 1 capsule (500 mg total) by mouth 2 (two) times daily.       Last Office Visit with PCP: 7/12/2024  .

## 2024-11-13 NOTE — TELEPHONE ENCOUNTER
Dear Nursing staff,      Please call patient to clarify refill request from pharmacy for Cephalexin 500mg. Last written 7/13/2024. Patient is not MyChart active.      Thank You ,  Annamaria Mcintyre

## 2024-11-13 NOTE — TELEPHONE ENCOUNTER
Action Requested: Summary for Provider     []  Critical Lab, Recommendations Needed  [x] Need Additional Advice  []   FYI    []   Need Orders  [x] Need Medications Sent to Pharmacy  []  Other     SUMMARY: Patient requesting for antibiotics (cephalexin). Reports burning while urinating. No fever. Denies abdominal pain or back pain. Patient advised evaluation or urine test. Patient states there is no way she can come in for an appointment today.  Patient states antibiotics helped last time. Advised to push fluids.  Please advise.    Reason for call: Urinary Symptoms (Burning while urinating 1 day ago)  Onset: Data Unavailable

## 2024-11-14 ENCOUNTER — LAB ENCOUNTER (OUTPATIENT)
Dept: LAB | Age: 89
End: 2024-11-14
Attending: PHYSICAL MEDICINE & REHABILITATION
Payer: MEDICARE

## 2024-11-14 ENCOUNTER — OFFICE VISIT (OUTPATIENT)
Dept: PHYSICAL MEDICINE AND REHAB | Facility: CLINIC | Age: 89
End: 2024-11-14
Payer: MEDICARE

## 2024-11-14 ENCOUNTER — HOSPITAL ENCOUNTER (OUTPATIENT)
Dept: GENERAL RADIOLOGY | Age: 89
Discharge: HOME | End: 2024-11-14
Attending: PHYSICAL MEDICINE & REHABILITATION
Payer: MEDICARE

## 2024-11-14 VITALS — BODY MASS INDEX: 20.2 KG/M2 | HEIGHT: 63 IN | WEIGHT: 114 LBS

## 2024-11-14 DIAGNOSIS — N39.0 URINARY TRACT INFECTION WITHOUT HEMATURIA, SITE UNSPECIFIED: ICD-10-CM

## 2024-11-14 DIAGNOSIS — M47.816 LUMBAR FACET ARTHROPATHY: ICD-10-CM

## 2024-11-14 DIAGNOSIS — M47.816 LUMBAR FACET ARTHROPATHY: Primary | ICD-10-CM

## 2024-11-14 LAB
BILIRUB UR QL: NEGATIVE
CLARITY UR: CLEAR
COLOR UR: YELLOW
GLUCOSE UR-MCNC: NORMAL MG/DL
HGB UR QL STRIP.AUTO: NEGATIVE
KETONES UR-MCNC: NEGATIVE MG/DL
LEUKOCYTE ESTERASE UR QL STRIP.AUTO: 25
NITRITE UR QL STRIP.AUTO: NEGATIVE
PH UR: 7 [PH] (ref 5–8)
PROT UR-MCNC: NEGATIVE MG/DL
SP GR UR STRIP: 1.01 (ref 1–1.03)
UROBILINOGEN UR STRIP-ACNC: NORMAL

## 2024-11-14 PROCEDURE — 87077 CULTURE AEROBIC IDENTIFY: CPT

## 2024-11-14 PROCEDURE — 87186 SC STD MICRODIL/AGAR DIL: CPT

## 2024-11-14 PROCEDURE — 87086 URINE CULTURE/COLONY COUNT: CPT

## 2024-11-14 PROCEDURE — 81001 URINALYSIS AUTO W/SCOPE: CPT

## 2024-11-14 PROCEDURE — 99204 OFFICE O/P NEW MOD 45 MIN: CPT | Performed by: PHYSICAL MEDICINE & REHABILITATION

## 2024-11-14 PROCEDURE — 72114 X-RAY EXAM L-S SPINE BENDING: CPT | Performed by: PHYSICAL MEDICINE & REHABILITATION

## 2024-11-22 NOTE — PROGRESS NOTES
[facility]  NEW PATIENT EVALUATION    Consultation as a request of Dr. Turk      HISTORY OF PRESENT ILLNESS:     Chief Complaint   Patient presents with    New Patient     New patient is here with complaints of right sided back pain and was referred by Dr. Turk. States pain comes when walking. CT of abdomen and pelvis was completed 9/6/24. States this has been an issue for months and has gotten progressively worse. No N/T. No physical therapy. Takes tylenol extra strength prn to ease pain Often has sharp pain but goes away once laying down.No injections . Pain 8/10       The patient is a 89 year old female with significant past medical history of DJD, hypertension who presents with right-sided low back pain.  Patient denies any specific injury or trauma.  She states the pain occurs with standing and walking.  Pain is localized she denies any radiating symptoms, any numbness tingling or weakness.  She has not any dedicated treatment.  She takes Tylenol Extra Strength.  Rates pain to be 8 out of 10.  Pain can go away when she is laying down.  Pain can be sharp in quality.  Pain that has been ongoing for last several months.    PHYSICAL EXAM:   Ht 63\"   Wt 114 lb (51.7 kg)   BMI 20.19 kg/m²     Gait  Able to toe walk and heel walk without any difficulty    LUMBAR SPINE:  Inspection: no erythema, swelling, or obvious deformity.  Their iliac crest and shoulder heights are symmetrical.     Palpation: Non tender to palpation of the spinous process.   ROM: Restricted in extension with reproduction of pain  Strength: 5/5 in bilateral lower extremities  Sensation: Intact to light touch in all dermatomes of the lower extremities  Reflexes: 2/4 at L4 and S1  Facet Loading: Positive right lower lumbar facet joint  Straight leg raise: negative for radicular pain symptoms  Slump test: negative for pain symptoms for radicular pain symptoms      IMAGING:     X-ray lumbar spine  completed 11/14/2024 was personally reviewed which is notable for dextroscoliosis with advanced degenerative disc disease and spondylosis with facet arthrosis    All imaging results were reviewed and discussed with patient.      ASSESSMENT/PLAN:     1. Lumbar facet arthropathy        Elsie Troy is a pleasant 89-year-old female presenting today for evaluation of right-sided low back pain with lumbar facet arthropathy.  I recommended x-ray imaging today which was reviewed.  I recommended starting a PT program for overall conditioning as she is starting to decondition.  Recommend that she follow-up in 4 weeks if pain is no better we will consider lumbar facet joint injections after MRI imaging.  I have recommended continuing Tylenol Extra Strength for pain management.      The patient verbalized understanding with the plan and was in agreement. All questions/concerns were addressed and there were no barriers to learning.  Please note Dragon dictation software was used to dictate this note and may result in inadvertent typos.    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    DJD (degenerative joint disease)    Essential hypertension    High blood pressure    Left arm swelling    US no thrombosis RONNIE or internal jugular    Left patella fracture    transverse comminuted fracutre throught mid patella         PAST SURGICAL HISTORY:     Past Surgical History:   Procedure Laterality Date    Appendectomy  age 25    Dermatology biopsy (d1c.2)      nose area    Hysterectomy  42 yrs old    PATRIZIA BSO         CURRENT MEDICATIONS:     Current Outpatient Medications   Medication Sig Dispense Refill    cephALEXin 500 MG Oral Cap Take 1 capsule (500 mg total) by mouth 2 (two) times  daily. 21 capsule 0    cephALEXin 500 MG Oral Cap Take 1 capsule (500 mg total) by mouth 2 (two) times daily. 14 capsule 0    rosuvastatin 40 MG Oral Tab Take 1 tablet (40 mg total) by mouth nightly. 90 tablet 3    acetaminophen-codeine 300-30 MG Oral Tab TAKE 1 TABLET BY MOUTH EVERY 12 HOURS 60 tablet 0    metoprolol succinate  MG Oral Tablet 24 Hr Take 1 tablet (100 mg total) by mouth daily. 90 tablet 3    losartan 100 MG Oral Tab Take 1 tablet (100 mg total) by mouth daily. 90 tablet 3    amLODIPine 5 MG Oral Tab Take 1 tablet (5 mg total) by mouth daily. 90 tablet 3    fluticasone propionate 50 MCG/ACT Nasal Suspension 2 sprays by Each Nare route daily. 16 g 3    Desoximetasone (TOPICORT) 0.25 % External Ointment appy  thin layer to lesion on the leg for 2-3 weeksa 60 g 0    mometasone 0.1 % External Ointment Apply to rash on the leg daily for 2 to 3 weeks, discontinue Topicort 45 g 0    Cobalamin Combinations (VITAMIN B12-FOLIC ACID) 500-400 MCG Oral Tab Take 40 mg by mouth nightly.      Ascorbic Acid (VITAMIN C OR) Take 1 tablet by mouth daily.      apixaban 2.5 MG Oral Tab Take 1 tablet (2.5 mg total) by mouth 2 (two) times daily. 60 tablet 0    Cholecalciferol (VITAMIN D3) 25 MCG (1000 UT) Oral Cap Take 1 tablet by mouth daily.      Multiple Vitamin (ONE-DAILY MULTI VITAMINS) Oral Tab Take 1 tablet by mouth daily.           ALLERGIES:   Allergies[1]      FAMILY HISTORY:     Family History   Problem Relation Age of Onset    Cancer Father         throat cancer    Other (Other) Mother         Parkinson's    Heart Disease Brother           SOCIAL HISTORY:     Social History     Socioeconomic History    Marital status:    Tobacco Use    Smoking status: Never    Smokeless tobacco: Never   Vaping Use    Vaping status: Never Used   Substance and Sexual Activity    Alcohol use: No     Alcohol/week: 0.0 standard drinks of alcohol    Drug use: No   Other Topics Concern    Caffeine Concern Yes     Comment:  1/2 cup coffee daily    Exercise Yes     Comment: stretches daily   Social History Narrative    The patient does not use an assistive device..      The patient does live in a home with stairs.    Patient has a history of alcohol use previously with depression.            REVIEW OF SYSTEMS:   Patient-reported ROS  Constitutional  Sleep Disturbance: admits  Chills: denies  Fever: denies  Weight Gain: denies  Weight Loss: admits   Cardiovascular  Chest Pain: denies  Irregular Heartbeat: denies   Respiratory  Painful Breathing: denies  Wheezing: denies   Gastrointestinal  Bowel Incontinence: denies  Heartburn: denies  Abdominal Pain: denies  Blood in Stool : denies  Rectal Pain: denies   Hematology  Easy Bruising: denies  Easy Bleeding: denies   Genitourinary  Difficulty Urinating: denies  Bladder Incontinence: denies  Pelvic Pain: denies  Painful Urination: denies   Musculoskeletal  Joint Stiffness: denies  Painful Joints: denies  Tailbone Pain: denies  Swollen Joints: denies   Peripheral Vascular  Swelling of Legs/Feet: denies  Cold Extremities: denies   Skin  Open Sores: denies  Nodules or Lumps: denies  Rash: denies   Neurological  Loss of Strength Since last Visit: admits (intermittent)  Tingling/Numbness: denies  Balance: admits   Psychiatric  Anxiety: denies  Depressed Mood: denies       PHYSICAL EXAM:   General: No immediate distress  Head: Normocephalic/ Atraumatic  Eyes: Extra-occular movements intact.   Ears: No auricular hematoma or deformities  Mouth: No lesions or ulcerations  Heart: peripheral pulses intact. Normal capillary refill.   Lungs: Non-labored respirations  Abdomen: No abdominal guarding  Extremities: No lower extremity edema bilaterally   Skin: No lesions noted   Cognition: alert & oriented x 3, attentive, able to follow 2 step commands, comprehention intact, spontaneous speech intact  Psychiatric: Mood and affect appropriate      LABS:     Lab Results   Component Value Date      04/23/2021    A1C 6.0 (H) 04/23/2021     Lab Results   Component Value Date    WBC 8.4 07/12/2024    RBC 3.38 (L) 07/12/2024    HGB 10.6 (L) 07/12/2024    HCT 33.0 (L) 07/12/2024    MCV 97.6 07/12/2024    MCH 31.4 07/12/2024    MCHC 32.1 07/12/2024    RDW 16.0 (H) 07/12/2024    .0 07/12/2024    MPV 8.4 06/07/2016     Lab Results   Component Value Date     (H) 07/12/2024    BUN 16 07/12/2024    BUNCREA 19.3 07/12/2024    CREATSERUM 0.83 07/12/2024    ANIONGAP 3 07/12/2024    GFRNAA 59 (L) 08/26/2021    GFRAA 68 08/26/2021    CA 9.3 07/12/2024    OSMOCALC 276 07/12/2024    ALKPHO 83 07/12/2024    AST 36 (H) 07/12/2024    ALT 35 07/12/2024    ALKPHOS 67 06/07/2016    BILT 1.1 (H) 07/12/2024    TP 8.5 (H) 07/12/2024    ALB 4.0 07/12/2024    GLOBULIN 4.5 (H) 07/12/2024    AGRATIO 0.7 (L) 06/07/2016     (L) 07/12/2024    K 4.3 07/12/2024     07/12/2024    CO2 28.0 07/12/2024     No results found for: \"PTP\", \"PT\", \"INR\"  No results found for: \"VITD\", \"QVITD\", \"[id number]\"                 [1] No Known Allergies

## 2024-11-23 ENCOUNTER — TELEPHONE (OUTPATIENT)
Dept: INTERNAL MEDICINE CLINIC | Facility: CLINIC | Age: 89
End: 2024-11-23

## 2024-11-23 RX ORDER — CIPROFLOXACIN 250 MG/1
250 TABLET, FILM COATED ORAL 2 TIMES DAILY
Qty: 14 TABLET | Refills: 0 | Status: SHIPPED | OUTPATIENT
Start: 2024-11-23

## 2024-11-29 ENCOUNTER — LAB ENCOUNTER (OUTPATIENT)
Dept: LAB | Age: 89
End: 2024-11-29
Attending: INTERNAL MEDICINE
Payer: MEDICARE

## 2024-11-29 LAB
BILIRUB UR QL: NEGATIVE
CLARITY UR: CLEAR
COLOR UR: COLORLESS
GLUCOSE UR-MCNC: NORMAL MG/DL
HGB UR QL STRIP.AUTO: NEGATIVE
KETONES UR-MCNC: NEGATIVE MG/DL
LEUKOCYTE ESTERASE UR QL STRIP.AUTO: NEGATIVE
NITRITE UR QL STRIP.AUTO: NEGATIVE
PH UR: 6.5 [PH] (ref 5–8)
PROT UR-MCNC: NEGATIVE MG/DL
SP GR UR STRIP: 1 (ref 1–1.03)
UROBILINOGEN UR STRIP-ACNC: NORMAL

## 2024-12-02 ENCOUNTER — NURSE TRIAGE (OUTPATIENT)
Dept: INTERNAL MEDICINE CLINIC | Facility: CLINIC | Age: 89
End: 2024-12-02

## 2024-12-02 NOTE — TELEPHONE ENCOUNTER
Please call patient or her daughterJoan  that I sent prescription for Flonase nasal spray to the pharmacy

## 2024-12-02 NOTE — TELEPHONE ENCOUNTER
Called patient's daughterCharlotte (name and , OCTAVIO verified) and instructed on providers message below. Verbalized understanding and agrees to plan.

## 2024-12-02 NOTE — TELEPHONE ENCOUNTER
Action Requested: Summary for Provider     []  Critical Lab, Recommendations Needed  [x] Need Additional Advice  []   FYI    []   Need Orders  [] Need Medications Sent to Pharmacy  []  Other     Dr Turk, please advise  Patient is asking what nasal spray that you have prescribed in the past that you said would work better then Flonase.    SUMMARY: Per Protocol disposition advised on home care for sinus congestion symptom management.    Patient (name and  verified) c/o sinus congestion, with irritation to her throat from post nasal drip. Denies fever, denies worsening cough or difficulty breathing.     Reason for call: Sinus Problem  Onset:     Reason for Disposition   Sinus congestion as part of a cold, present < 10 days    Protocols used: Sinus Pain and Congestion-A-[place]

## 2024-12-05 ENCOUNTER — OFFICE VISIT (OUTPATIENT)
Dept: INTERNAL MEDICINE CLINIC | Facility: CLINIC | Age: 89
End: 2024-12-05

## 2024-12-05 VITALS
WEIGHT: 111.38 LBS | TEMPERATURE: 98 F | OXYGEN SATURATION: 97 % | RESPIRATION RATE: 16 BRPM | DIASTOLIC BLOOD PRESSURE: 84 MMHG | HEART RATE: 79 BPM | SYSTOLIC BLOOD PRESSURE: 150 MMHG | BODY MASS INDEX: 20 KG/M2

## 2024-12-05 DIAGNOSIS — I10 ESSENTIAL HYPERTENSION: ICD-10-CM

## 2024-12-05 DIAGNOSIS — R09.82 POST-NASAL DRAINAGE: Primary | ICD-10-CM

## 2024-12-05 PROCEDURE — 99213 OFFICE O/P EST LOW 20 MIN: CPT | Performed by: NURSE PRACTITIONER

## 2024-12-05 RX ORDER — ECHINACEA PURPUREA EXTRACT 125 MG
2 TABLET ORAL AS NEEDED
Qty: 60 ML | Refills: 0 | Status: SHIPPED | OUTPATIENT
Start: 2024-12-05

## 2024-12-05 NOTE — PATIENT INSTRUCTIONS
Mucinex (as directed on package) or Coricidin HBP cough syrup  Humidifier  Nasal saline every 3-4 hours  Continue flonase

## 2024-12-05 NOTE — PROGRESS NOTES
Elsie Troy is a 89 year old female.  HPI:   Pt reports post nasal drainage for the past 4 days that is causing her to cough. She is taking flonase which is helping but has not resolved yetREports she is drinking water.   Denies any fever, chills, CP, SO, HA, dizziness, vision changes, palpitations, abdominal pain, NVDc.   Current Outpatient Medications   Medication Sig Dispense Refill    sodium chloride 0.65 % Nasal Solution 2 sprays by Nasal route as needed. 60 mL 0    fluticasone propionate 50 MCG/ACT Nasal Suspension 2 sprays by Each Nare route daily. 1 each 1    rosuvastatin 40 MG Oral Tab Take 1 tablet (40 mg total) by mouth nightly. 90 tablet 3    metoprolol succinate  MG Oral Tablet 24 Hr Take 1 tablet (100 mg total) by mouth daily. 90 tablet 3    losartan 100 MG Oral Tab Take 1 tablet (100 mg total) by mouth daily. 90 tablet 3    amLODIPine 5 MG Oral Tab Take 1 tablet (5 mg total) by mouth daily. 90 tablet 3    fluticasone propionate 50 MCG/ACT Nasal Suspension 2 sprays by Each Nare route daily. 16 g 3    Desoximetasone (TOPICORT) 0.25 % External Ointment appy  thin layer to lesion on the leg for 2-3 weeksa 60 g 0    mometasone 0.1 % External Ointment Apply to rash on the leg daily for 2 to 3 weeks, discontinue Topicort 45 g 0    Cobalamin Combinations (VITAMIN B12-FOLIC ACID) 500-400 MCG Oral Tab Take 40 mg by mouth nightly.      Ascorbic Acid (VITAMIN C OR) Take 1 tablet by mouth daily.      apixaban 2.5 MG Oral Tab Take 1 tablet (2.5 mg total) by mouth 2 (two) times daily. 60 tablet 0    Cholecalciferol (VITAMIN D3) 25 MCG (1000 UT) Oral Cap Take 1 tablet by mouth daily.      Multiple Vitamin (ONE-DAILY MULTI VITAMINS) Oral Tab Take 1 tablet by mouth daily.      ciprofloxacin (CIPRO) 250 MG Oral Tab Take 1 tablet (250 mg total) by mouth 2 (two) times daily. (Patient not taking: Reported on 12/5/2024) 14 tablet 0    cephALEXin 500 MG Oral Cap Take 1 capsule (500 mg total) by mouth 2 (two) times  daily. (Patient not taking: Reported on 12/5/2024) 21 capsule 0    cephALEXin 500 MG Oral Cap Take 1 capsule (500 mg total) by mouth 2 (two) times daily. 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    DJD (degenerative joint disease)    Essential hypertension    High blood pressure    Left arm swelling    US no thrombosis RONNIE or internal jugular    Left patella fracture    transverse comminuted fracutre throught mid patella      Social History:  Social History     Socioeconomic History    Marital status:    Tobacco Use    Smoking status: Never    Smokeless tobacco: Never   Vaping Use    Vaping status: Never Used   Substance and Sexual Activity    Alcohol use: No     Alcohol/week: 0.0 standard drinks of alcohol    Drug use: No   Other Topics Concern    Caffeine Concern Yes     Comment: 1/2 cup coffee daily    Exercise Yes     Comment: stretches daily   Social History Narrative    The patient does not use an assistive device..      The patient does live in a home with stairs.    Patient has a history of alcohol use previously with depression.          REVIEW OF SYSTEMS:   Review of Systems   All other systems reviewed and are negative.         EXAM:   /84 (BP Location: Left arm, Patient Position: Sitting, Cuff Size: adult)   Pulse 79   Temp 97.7 °F (36.5 °C) (Temporal)   Resp 16   Wt 111 lb 6.4 oz (50.5 kg)   SpO2 97%   BMI 19.73 kg/m²     Physical Exam  Vitals reviewed.   Constitutional:       General: She is not in acute distress.     Appearance: Normal appearance. She is normal weight. She is not ill-appearing, toxic-appearing or diaphoretic.   HENT:      Head: Normocephalic  and atraumatic.      Right Ear: Tympanic membrane, ear canal and external ear normal.      Left Ear: Tympanic membrane, ear canal and external ear normal.      Nose: Congestion present. No rhinorrhea.      Mouth/Throat:      Pharynx: Oropharynx is clear. No oropharyngeal exudate or posterior oropharyngeal erythema.   Eyes:      General: No scleral icterus.        Right eye: No discharge.         Left eye: No discharge.      Extraocular Movements: Extraocular movements intact.      Conjunctiva/sclera: Conjunctivae normal.      Pupils: Pupils are equal, round, and reactive to light.   Neck:      Thyroid: No thyroid mass or thyromegaly.   Cardiovascular:      Rate and Rhythm: Normal rate and regular rhythm.      Pulses: Normal pulses.      Heart sounds: Normal heart sounds.   Pulmonary:      Effort: Pulmonary effort is normal. No respiratory distress.      Breath sounds: Normal breath sounds. No stridor. No wheezing, rhonchi or rales.   Chest:      Chest wall: No tenderness.   Musculoskeletal:      Cervical back: Normal range of motion and neck supple. No rigidity or tenderness.   Lymphadenopathy:      Cervical: No cervical adenopathy.   Skin:     General: Skin is warm and dry.      Coloration: Skin is not jaundiced.   Neurological:      General: No focal deficit present.      Mental Status: She is alert and oriented to person, place, and time.      Motor: Motor function is intact.   Psychiatric:         Mood and Affect: Mood normal.         Judgment: Judgment normal.            ASSESSMENT AND PLAN:   1. Post-nasal drainage  Nasal saline 2 sprays in each nostril every 3-4 hours as needed.   Flonase 1-2 sprays in each nostril once daily.   Tylenol 500 mg every 6-8 hours as needed (max dose 3000 mg/day).   Hydrate, humidifier, rest, honey/elderberry   - sodium chloride 0.65 % Nasal Solution; 2 sprays by Nasal route as needed.  Dispense: 60 mL; Refill: 0     2. Essential HTN  Continue to monitor BP at home, RTC if  >130/90.    The patient indicates understanding of these issues and agrees to the plan.  The patient is asked to return in PRN.     The above note was creating using Dragon speech recognition technology. Please excuse any typos.

## 2024-12-26 RX ORDER — AMLODIPINE BESYLATE 5 MG/1
5 TABLET ORAL DAILY
Qty: 90 TABLET | Refills: 3 | OUTPATIENT
Start: 2024-12-26

## 2024-12-26 RX ORDER — LOSARTAN POTASSIUM 100 MG/1
100 TABLET ORAL DAILY
Qty: 90 TABLET | Refills: 3 | OUTPATIENT
Start: 2024-12-26

## 2025-01-10 ENCOUNTER — NURSE TRIAGE (OUTPATIENT)
Dept: INTERNAL MEDICINE CLINIC | Facility: CLINIC | Age: OVER 89
End: 2025-01-10

## 2025-01-10 NOTE — TELEPHONE ENCOUNTER
Action Requested: Summary for Provider     []  Critical Lab, Recommendations Needed  [] Need Additional Advice  []   FYI    []   Need Orders  [] Need Medications Sent to Pharmacy  []  Other     SUMMARY: c/o itching all over her body starting 3 days ago. Scheduled appointment for Tuesday at 6:20pm.    Reason for call: No chief complaint on file.  Onset: 3 days    Received call from the patients daughter stating the patient is having Itching all over, on  her chest, stomach, under her arms, on her back and under her breasts, she has bumps underneath her breasts, Patient states she has bumps on her back as well - mostly on her right side. The daughter looked at the bumps and stated there are bumps that look like scabs. She has been using Jergens lotion and Vasaline which helps for a little while. Has not started any new medications, no new lotions or soaps. No rash present. Has not taken anything for the itching. Scheduled patient for an appointment on Monday at 6:20 pm. Advised oatmeal baths, benadryl or zyrtec. Patient and daughter agreeable to plan.     Reason for Disposition   Widespread itching and cause unknown and present > 48 hours    Protocols used: Itching - Widespread-A-[place]

## 2025-03-13 RX ORDER — METOPROLOL SUCCINATE 100 MG/1
100 TABLET, EXTENDED RELEASE ORAL DAILY
Qty: 90 TABLET | Refills: 3 | Status: SHIPPED | OUTPATIENT
Start: 2025-03-13

## 2025-04-28 ENCOUNTER — NURSE TRIAGE (OUTPATIENT)
Dept: INTERNAL MEDICINE CLINIC | Facility: CLINIC | Age: OVER 89
End: 2025-04-28

## 2025-04-28 NOTE — TELEPHONE ENCOUNTER
Action Requested: Summary for Provider     []  Critical Lab, Recommendations Needed  [] Need Additional Advice  [x]   FYI    []   Need Orders  [] Need Medications Sent to Pharmacy  []  Other     SUMMARY: vaginal itching that is mild and intermittent. Denies any other symptoms. Same/Next day office visit offered --> agreeable and scheduled. [See below for more details]     Reason for call: Vaginal Problem  Onset: 4/25/25    Reason for Disposition   Itching or dryness of genital area and nearing menopause or after menopause   Symptoms of a 'yeast infection' (i.e., itchy, white discharge, not bad smelling) and not improved > 3 days following Care Advice    Protocols used: Vulvar Symptoms-A-[place]      Daughter/Kim (Last signed Verbal Release verified) called with patient present and patient reports intermittent  mild vaginal itching since onset. Denies any discharge, rashes/blisters, redness, dysuria, fevers, foul odor. She has been applying some Vaseline intensive care to vulva. Drinking cranberry juice and pushing fluids. Refer to system/assessment yes/no answers. Same/Next day office visit offered --> agreeable and scheduled.  Home Care Advice discussed, per protocol. Patient instructed any new or worsening symptoms [reviewed] seek immediate medical attention-->Immediate Care. Patient verbalized understanding. No further questions or concerns at this time.    Future Appointments   Date Time Provider Department Center   4/29/2025  5:30 PM [facility] [place]

## 2025-04-29 ENCOUNTER — OFFICE VISIT (OUTPATIENT)
Dept: FAMILY MEDICINE CLINIC | Facility: CLINIC | Age: OVER 89
End: 2025-04-29

## 2025-04-29 VITALS
HEIGHT: 63 IN | WEIGHT: 114.5 LBS | BODY MASS INDEX: 20.29 KG/M2 | SYSTOLIC BLOOD PRESSURE: 144 MMHG | DIASTOLIC BLOOD PRESSURE: 94 MMHG | HEART RATE: 77 BPM

## 2025-04-29 DIAGNOSIS — B35.6 TINEA CRURIS: Primary | ICD-10-CM

## 2025-04-29 PROCEDURE — 99213 OFFICE O/P EST LOW 20 MIN: CPT | Performed by: PHYSICIAN ASSISTANT

## 2025-04-29 RX ORDER — MICONAZOLE NITRATE 20 MG/G
1 CREAM TOPICAL 2 TIMES DAILY
Qty: 56 G | Refills: 0 | Status: SHIPPED | OUTPATIENT
Start: 2025-04-29

## 2025-04-29 NOTE — PROGRESS NOTES
HPI:     HPI  A 89-year-old woman has presented with intermittent genital itching for the past month. She denies fever, vaginal itching, or pain.    Medications:   Current Medications[1]    Allergies:   Allergies[2]    History:     Health Maintenance   Topic Date Due    Annual Physical  Never done    Zoster Vaccines (1 of 2) Never done    COVID-19 Vaccine (4 - 2024-25 season) 09/01/2024    Annual Depression Screening  01/01/2025    Fall Risk Screening (Annual)  01/01/2025    Influenza Vaccine (Season Ended) 10/01/2025    Pneumococcal Vaccine: 50+ Years  Completed    Meningococcal B Vaccine  Aged Out       No LMP recorded. Patient is postmenopausal.   Past Medical History:   Past Medical History[3]    Past Surgical History:   Past Surgical History[4]    Family History:   Family History[5]    Social History:     Social History     Socioeconomic History    Marital status:      Spouse name: Not on file    Number of children: Not on file    Years of education: Not on file    Highest education level: Not on file   Occupational History    Not on file   Tobacco Use    Smoking status: Never     Passive exposure: Never    Smokeless tobacco: Never   Vaping Use    Vaping status: Never Used   Substance and Sexual Activity    Alcohol use: No     Alcohol/week: 0.0 standard drinks of alcohol    Drug use: No    Sexual activity: Not on file   Other Topics Concern    Caffeine Concern Yes     Comment: 1/2 cup coffee daily    Exercise Yes     Comment: stretches daily    Seat Belt Not Asked    Special Diet Not Asked    Stress Concern Not Asked    Weight Concern Not Asked     Service Not Asked    Blood Transfusions Not Asked    Occupational Exposure Not Asked    Hobby Hazards Not Asked    Sleep Concern Not Asked    Back Care Not Asked    Bike Helmet Not Asked    Self-Exams Not Asked   Social History Narrative    The patient does not use an assistive device..      The patient does live in a home with stairs.    Patient has a  history of alcohol use previously with depression.       Social Drivers of Health     Food Insecurity: Not on file   Transportation Needs: Not on file   Stress: Not on file   Housing Stability: Not on file       Review of Systems:   Review of Systems   Genitourinary:  Negative for vaginal discharge and vaginal pain.        Vitals:    04/29/25 1728 04/29/25 1745   BP: 156/89 (!) 144/94   Pulse: 77    Weight: 114 lb 8 oz (51.9 kg)    Height: 5' 3\" (1.6 m)      Body mass index is 20.28 kg/m².    Physical Exam:   Physical Exam  Vitals reviewed.   Genitourinary:     Labia:         Right: No rash, tenderness or lesion.         Left: No rash, tenderness or lesion.    Skin:     Findings: No rash.          Assessment and Plan::     Assessment & Plan  Tinea cruris    Orders:    Miconazole Nitrate 2 % External Cream; Apply 1 Application topically 2 (two) times daily.       Discussed plan of care with pt and pt is in agreement.All questions answered. Pt to call with questions or concerns.         [1]   Current Outpatient Medications   Medication Sig Dispense Refill    Miconazole Nitrate 2 % External Cream Apply 1 Application topically 2 (two) times daily. 56 g 0    metoprolol succinate  MG Oral Tablet 24 Hr Take 1 tablet (100 mg total) by mouth daily. 90 tablet 3    fluticasone propionate 50 MCG/ACT Nasal Suspension 2 sprays by Each Nare route daily. 1 each 1    rosuvastatin 40 MG Oral Tab Take 1 tablet (40 mg total) by mouth nightly. 90 tablet 3    losartan 100 MG Oral Tab Take 1 tablet (100 mg total) by mouth daily. 90 tablet 3    amLODIPine 5 MG Oral Tab Take 1 tablet (5 mg total) by mouth daily. 90 tablet 3    Desoximetasone (TOPICORT) 0.25 % External Ointment appy  thin layer to lesion on the leg for 2-3 weeksa 60 g 0    mometasone 0.1 % External Ointment Apply to rash on the leg daily for 2 to 3 weeks, discontinue Topicort 45 g 0    Cobalamin Combinations (VITAMIN B12-FOLIC ACID) 500-400 MCG Oral Tab Take 40 mg by  mouth nightly.      Ascorbic Acid (VITAMIN C OR) Take 1 tablet by mouth daily.      apixaban 2.5 MG Oral Tab Take 1 tablet (2.5 mg total) by mouth 2 (two) times daily. 60 tablet 0    Cholecalciferol (VITAMIN D3) 25 MCG (1000 UT) Oral Cap Take 1 tablet by mouth daily.      Multiple Vitamin (ONE-DAILY MULTI VITAMINS) Oral Tab Take 1 tablet by mouth daily.      sodium chloride 0.65 % Nasal Solution 2 sprays by Nasal route as needed. 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

## 2025-04-30 NOTE — TELEPHONE ENCOUNTER
Patient and daughter returned Dr. Turk's call for condition update.  States she is still on cephalexin, temperature today is 99.0.  She continues to feel fatigued.  Denies chest pain or shortness of breath.  She does have some dizziness upon standing.  Denies confusion or mental status changes.  Dr. Turk FYI and please advise if there is any further advice.    119.9 30-Apr-2025 23:42

## 2025-05-08 ENCOUNTER — LAB ENCOUNTER (OUTPATIENT)
Dept: LAB | Age: OVER 89
End: 2025-05-08
Attending: INTERNAL MEDICINE
Payer: MEDICARE

## 2025-05-08 DIAGNOSIS — E78.00 PURE HYPERCHOLESTEROLEMIA: ICD-10-CM

## 2025-05-08 DIAGNOSIS — I10 ESSENTIAL HYPERTENSION: ICD-10-CM

## 2025-05-08 DIAGNOSIS — I48.19 PERSISTENT ATRIAL FIBRILLATION (HCC): ICD-10-CM

## 2025-05-08 DIAGNOSIS — I12.9 BENIGN HYPERTENSION WITH CHRONIC KIDNEY DISEASE, STAGE III (HCC): Primary | ICD-10-CM

## 2025-05-08 DIAGNOSIS — N18.30 BENIGN HYPERTENSION WITH CHRONIC KIDNEY DISEASE, STAGE III (HCC): Primary | ICD-10-CM

## 2025-05-08 LAB
ALBUMIN SERPL-MCNC: 4.1 G/DL (ref 3.2–4.8)
ALBUMIN/GLOB SERPL: 0.9 {RATIO} (ref 1–2)
ALP LIVER SERPL-CCNC: 78 U/L (ref 55–142)
ALT SERPL-CCNC: 22 U/L (ref 10–49)
ANION GAP SERPL CALC-SCNC: 5 MMOL/L (ref 0–18)
AST SERPL-CCNC: 35 U/L (ref ?–34)
BASOPHILS # BLD AUTO: 0.05 X10(3) UL (ref 0–0.2)
BASOPHILS NFR BLD AUTO: 0.7 %
BILIRUB SERPL-MCNC: 0.8 MG/DL (ref 0.2–0.9)
BUN BLD-MCNC: 15 MG/DL (ref 9–23)
BUN/CREAT SERPL: 16.5 (ref 10–20)
CALCIUM BLD-MCNC: 9.3 MG/DL (ref 8.7–10.4)
CHLORIDE SERPL-SCNC: 97 MMOL/L (ref 98–112)
CHOLEST SERPL-MCNC: 226 MG/DL (ref ?–200)
CO2 SERPL-SCNC: 29 MMOL/L (ref 21–32)
CREAT BLD-MCNC: 0.91 MG/DL (ref 0.55–1.02)
DEPRECATED RDW RBC AUTO: 50.6 FL (ref 35.1–46.3)
EGFRCR SERPLBLD CKD-EPI 2021: 58 ML/MIN/1.73M2 (ref 60–?)
EOSINOPHIL # BLD AUTO: 0.05 X10(3) UL (ref 0–0.7)
EOSINOPHIL NFR BLD AUTO: 0.7 %
ERYTHROCYTE [DISTWIDTH] IN BLOOD BY AUTOMATED COUNT: 13.9 % (ref 11–15)
FASTING PATIENT LIPID ANSWER: YES
FASTING STATUS PATIENT QL REPORTED: YES
GLOBULIN PLAS-MCNC: 4.7 G/DL (ref 2–3.5)
GLUCOSE BLD-MCNC: 94 MG/DL (ref 70–99)
HCT VFR BLD AUTO: 38.8 % (ref 35–48)
HDLC SERPL-MCNC: 70 MG/DL (ref 40–59)
HGB BLD-MCNC: 12.9 G/DL (ref 12–16)
IMM GRANULOCYTES # BLD AUTO: 0.02 X10(3) UL (ref 0–1)
IMM GRANULOCYTES NFR BLD: 0.3 %
LDLC SERPL CALC-MCNC: 136 MG/DL (ref ?–100)
LYMPHOCYTES # BLD AUTO: 3.08 X10(3) UL (ref 1–4)
LYMPHOCYTES NFR BLD AUTO: 45.3 %
MCH RBC QN AUTO: 32.7 PG (ref 26–34)
MCHC RBC AUTO-ENTMCNC: 33.2 G/DL (ref 31–37)
MCV RBC AUTO: 98.2 FL (ref 80–100)
MONOCYTES # BLD AUTO: 0.41 X10(3) UL (ref 0.1–1)
MONOCYTES NFR BLD AUTO: 6 %
NEUTROPHILS # BLD AUTO: 3.19 X10 (3) UL (ref 1.5–7.7)
NEUTROPHILS # BLD AUTO: 3.19 X10(3) UL (ref 1.5–7.7)
NEUTROPHILS NFR BLD AUTO: 47 %
NONHDLC SERPL-MCNC: 156 MG/DL (ref ?–130)
OSMOLALITY SERPL CALC.SUM OF ELEC: 273 MOSM/KG (ref 275–295)
PLATELET # BLD AUTO: 219 10(3)UL (ref 150–450)
POTASSIUM SERPL-SCNC: 4.2 MMOL/L (ref 3.5–5.1)
PROT SERPL-MCNC: 8.8 G/DL (ref 5.7–8.2)
RBC # BLD AUTO: 3.95 X10(6)UL (ref 3.8–5.3)
SODIUM SERPL-SCNC: 131 MMOL/L (ref 136–145)
TRIGL SERPL-MCNC: 116 MG/DL (ref 30–149)
VLDLC SERPL CALC-MCNC: 21 MG/DL (ref 0–30)
WBC # BLD AUTO: 6.8 X10(3) UL (ref 4–11)

## 2025-05-08 PROCEDURE — 80053 COMPREHEN METABOLIC PANEL: CPT

## 2025-05-08 PROCEDURE — 85025 COMPLETE CBC W/AUTO DIFF WBC: CPT

## 2025-05-08 PROCEDURE — 80061 LIPID PANEL: CPT

## 2025-05-08 PROCEDURE — 36415 COLL VENOUS BLD VENIPUNCTURE: CPT

## 2025-06-21 ENCOUNTER — HOSPITAL ENCOUNTER (OUTPATIENT)
Age: OVER 89
Discharge: HOME | End: 2025-06-21
Payer: MEDICARE

## 2025-06-21 VITALS
TEMPERATURE: 98 F | SYSTOLIC BLOOD PRESSURE: 141 MMHG | HEART RATE: 79 BPM | RESPIRATION RATE: 18 BRPM | DIASTOLIC BLOOD PRESSURE: 76 MMHG | OXYGEN SATURATION: 95 %

## 2025-06-21 DIAGNOSIS — H61.20 WAX IN EAR: Primary | ICD-10-CM

## 2025-06-21 NOTE — ED PROVIDER NOTES
Chief Complaint   Patient presents with    Ear Problem       HPI:     Elsie Troy is a 89 year old female who presents for evaluation of ear evaluation pending hearing test tomorrow.  Patient notes requested to have ears checked for potential cerumen with previous history without associated symptoms including headache dizziness earache sore throat congestion neck pain chest pain shortness of breath abdominal pain back pain upper extremity weakness or numbness.      PFSH    PFSH asessment screens reviewed and agree.  Nurses notes reviewed I agree with documentation.    Family History[1]  Family history reviewed with patient/caregiver and is not pertinent to presenting problem.  Social History     Socioeconomic History    Marital status:      Spouse name: Not on file    Number of children: Not on file    Years of education: Not on file    Highest education level: Not on file   Occupational History    Not on file   Tobacco Use    Smoking status: Never     Passive exposure: Never    Smokeless tobacco: Never   Vaping Use    Vaping status: Never Used   Substance and Sexual Activity    Alcohol use: No     Alcohol/week: 0.0 standard drinks of alcohol    Drug use: No    Sexual activity: Not on file   Other Topics Concern    Caffeine Concern Yes     Comment: 1/2 cup coffee daily    Exercise Yes     Comment: stretches daily    Seat Belt Not Asked    Special Diet Not Asked    Stress Concern Not Asked    Weight Concern Not Asked     Service Not Asked    Blood Transfusions Not Asked    Occupational Exposure Not Asked    Hobby Hazards Not Asked    Sleep Concern Not Asked    Back Care Not Asked    Bike Helmet Not Asked    Self-Exams Not Asked   Social History Narrative    The patient does not use an assistive device..      The patient does live in a home with stairs.    Patient has a history of alcohol use previously with depression.       Social Drivers of Health     Food Insecurity: Not on file    Transportation Needs: Not on file   Housing Stability: Not on file         ROS:   Positive for stated complaint: Ear evaluation.  All other systems reviewed and negative except as noted above.  Constitutional and Vital Signs Reviewed.      Physical Exam:     Findings:    /76   Pulse 79   Temp 97.5 °F (36.4 °C) (Oral)   Resp 18   SpO2 95%   GENERAL: well developed, well nourished, well hydrated, no distress  SKIN: good skin turgor, no obvious rashes  EXTREMITIES: no cyanosis or edema. MONTALVO without difficulty  HEAD: normocephalic, atraumatic  EYES: sclera non icteric bilateral, conjunctiva clear  EARS: Scant cerumen along the right proximal canal.  No cerumen to the left canal.  TMs clear bilaterally. Canals without erythema or effusion.  NOSE: nasal turbinates: pink, normal mucosa  NEURO: No focal deficits  PSYCH: Alert and oriented x3.  Answering questions appropriately.  Mood appropriate.    MDM/Assessment/Plan:   Orders for this encounter:    Orders Placed This Encounter    carbamide peroxide 6.5 % Otic Solution     Sig: Place 5 drops into both ears daily for 14 days.     Dispense:  15 mL     Refill:  0       Labs performed this visit:  No results found for this or any previous visit (from the past 10 hours).    MDM:  Scant wax of the right canal removed with forcep without irrigation or other complication.  Patient given Debrox for maintenance support after discussing with patient and family and will follow-up through primary as needed, happy to plan of care    Diagnosis:    ICD-10-CM    1. Wax in ear  H61.20           All results reviewed and discussed with patient.  See AVS for detailed discharge instructions for your condition today.    Follow Up with:  Anais Turk MD  [address] [place] [address] [phone]    Schedule an appointment as soon as possible for a visit   As needed         [1]   Family History  Problem Relation Age of Onset    Cancer Father         throat cancer    Other  (Other) Mother         Parkinson's    Heart Disease Brother

## 2025-08-07 RX ORDER — ROSUVASTATIN CALCIUM 40 MG/1
40 TABLET, COATED ORAL NIGHTLY
Qty: 90 TABLET | Refills: 3 | Status: SHIPPED | OUTPATIENT
Start: 2025-08-07

## (undated) NOTE — Clinical Note
Pt has HFU appt set for 4/30/21. Pt reports she is doing well. Medication list reviewed. Pt is aware to FU with Neurology and cardiology. Thank you.

## (undated) NOTE — LETTER
04/22/21        [facility]  [address]      Dear [facility],    Our records indicate that you have outstanding lab work and or testing that was ordered for you and has not yet been completed:  Orders Placed This Encounter      TS